# Patient Record
Sex: MALE | Race: WHITE | NOT HISPANIC OR LATINO | URBAN - METROPOLITAN AREA
[De-identification: names, ages, dates, MRNs, and addresses within clinical notes are randomized per-mention and may not be internally consistent; named-entity substitution may affect disease eponyms.]

---

## 2018-06-25 NOTE — LETTER
July 3, 2018     Den Ramirez  [address]   [address]  [place] [address]      Dear Mr. Ramirez:    Below are the results from your recent visit:    Resulted Orders   Comprehensive metabolic panel   Result Value Ref Range    Glucose, Serum 95 65 - 99 mg/dL    BUN 26 8 - 27 mg/dL    Creatinine, Serum 1.63 (H) 0.76 - 1.27 mg/dL    eGFR If NonAfricn Am 39 (L) >59 mL/min/1.73    eGFR If Africn Am 45 (L) >59 mL/min/1.73    BUN/Creatinine Ratio 16 10 - 24    Sodium, Serum 139 134 - 144 mmol/L    Potassium, Serum 4.3 3.5 - 5.2 mmol/L    Chloride, Serum 101 96 - 106 mmol/L    Carbon Dioxide, Total 21 20 - 29 mmol/L      Comment:                    **Please note reference interval change**    Calcium, Serum 8.9 8.6 - 10.2 mg/dL    Protein, Total, Serum 6.4 6.0 - 8.5 g/dL    Albumin, Serum 4.0 3.5 - 4.7 g/dL    Globulin, Total 2.4 1.5 - 4.5 g/dL    A/G Ratio 1.7 1.2 - 2.2    Bilirubin, Total 0.7 0.0 - 1.2 mg/dL    Alkaline Phosphatase, S 76 39 - 117 IU/L    AST (SGOT) 20 0 - 40 IU/L    ALT (SGPT) 11 0 - 44 IU/L    Narrative    Performed at:  [address]  [id number]  : Araceli B Reyes MD, Phone:  [phone]   Lipid panel   Result Value Ref Range    Cholesterol, Total 178 100 - 199 mg/dL    Triglycerides 83 0 - 149 mg/dL    HDL Cholesterol 72 >39 mg/dL    VLDL Cholesterol Jeremie 17 5 - 40 mg/dL    LDL Cholesterol Calc 89 0 - 99 mg/dL    Narrative    Performed at:  [address]  [id number]  : Araceli B Reyes MD, Phone:  [phone]   KARLA STRINGER CMP14 DEFAULT   Result Value Ref Range    Karla stringer Comment       Comment:      A hand-written panel/profile was received from your office. In  accordance with the [facility] Ambiguous Test Code Policy dated July 2003, we have completed your order by using the closest currently  or formerly recognized AMA panel.  We have assigned Comprehensive  Metabolic Panel (14), Test Code  #014731 to this request.  If this  is not the testing you wished to receive on this specimen, please  contact the [facility] Client Inquiry/Technical Services Department  to clarify the test order.  We appreciate your business.      Narrative    Performed at:  [address]  [id number]  : Araceli B Reyes MD, Phone:  [phone]   MAGDALENA STRINGER LP DEFAULT   Result Value Ref Range    Magdalena stringer Comment       Comment:      A hand-written panel/profile was received from your office. In  accordance with the [facility] Ambiguous Test Code Policy dated July 2003, we have completed your order by using the closest currently  or formerly recognized AMA panel.  We have assigned Lipid Panel,  Test Code #649984 to this request. If this is not the testing you  wished to receive on this specimen, please [facility]  Client Inquiry/Technical Services Department to clarify the test  order.  We appreciate your business.      Narrative    Performed at:  [address]  [id number]  : Araceli B Reyes MD, Phone:  [phone]     The test results show that they are stable. Please continue your current medication and plan.   If you have any questions or concerns, please do not hesitate to call.    Sincerely,          Vera Whitten, DO

## 2018-06-26 LAB
ALBUMIN SERPL-MCNC: 4 G/DL (ref 3.5–4.7)
ALBUMIN/GLOB SERPL: 1.7 {RATIO} (ref 1.2–2.2)
ALP SERPL-CCNC: 76 IU/L (ref 39–117)
ALT SERPL-CCNC: 11 IU/L (ref 0–44)
AST SERPL-CCNC: 20 IU/L (ref 0–40)
BILIRUB SERPL-MCNC: 0.7 MG/DL (ref 0–1.2)
BUN SERPL-MCNC: 26 MG/DL (ref 8–27)
BUN/CREAT SERPL: 16 (ref 10–24)
CALCIUM SERPL-MCNC: 8.9 MG/DL (ref 8.6–10.2)
CHLORIDE SERPL-SCNC: 101 MMOL/L (ref 96–106)
CHOLEST SERPL-MCNC: 178 MG/DL (ref 100–199)
CO2 SERPL-SCNC: 21 MMOL/L (ref 20–29)
CREAT SERPL-MCNC: 1.63 MG/DL (ref 0.76–1.27)
GFR SERPLBLD CREATININE-BSD FMLA CKD-EPI: 39 ML/MIN/1.73
GFR SERPLBLD CREATININE-BSD FMLA CKD-EPI: 45 ML/MIN/1.73
GLOBULIN SER CALC-MCNC: 2.4 G/DL (ref 1.5–4.5)
GLUCOSE SERPL-MCNC: 95 MG/DL (ref 65–99)
HDLC SERPL-MCNC: 72 MG/DL
LABCORP AMBIG ABBREV: NORMAL
LABCORP AMBIG ABBREV: NORMAL
LDLC SERPL CALC-MCNC: 89 MG/DL (ref 0–99)
POTASSIUM SERPL-SCNC: 4.3 MMOL/L (ref 3.5–5.2)
PROT SERPL-MCNC: 6.4 G/DL (ref 6–8.5)
SODIUM SERPL-SCNC: 139 MMOL/L (ref 134–144)
TRIGL SERPL-MCNC: 83 MG/DL (ref 0–149)
VLDLC SERPL CALC-MCNC: 17 MG/DL (ref 5–40)

## 2018-06-29 ENCOUNTER — OFFICE VISIT (OUTPATIENT)
Dept: FAMILY MEDICINE | Facility: CLINIC | Age: 83
End: 2018-06-29
Payer: COMMERCIAL

## 2018-06-29 VITALS
HEART RATE: 83 BPM | DIASTOLIC BLOOD PRESSURE: 90 MMHG | SYSTOLIC BLOOD PRESSURE: 132 MMHG | TEMPERATURE: 97.6 F | BODY MASS INDEX: 30.3 KG/M2 | OXYGEN SATURATION: 97 % | HEIGHT: 69 IN | WEIGHT: 204.6 LBS

## 2018-06-29 DIAGNOSIS — Z00.00 MEDICARE ANNUAL WELLNESS VISIT, SUBSEQUENT: Primary | ICD-10-CM

## 2018-06-29 PROBLEM — I10 HYPERTENSION: Status: ACTIVE | Noted: 2018-06-29

## 2018-06-29 PROBLEM — E78.5 DYSLIPIDEMIA: Status: ACTIVE | Noted: 2018-06-29

## 2018-06-29 PROCEDURE — G0439 PPPS, SUBSEQ VISIT: HCPCS | Performed by: FAMILY MEDICINE

## 2018-06-29 RX ORDER — MOMETASONE FUROATE MONOHYDRATE 50 UG/1
SPRAY, METERED NASAL
COMMUNITY
Start: 2017-12-19 | End: 2019-01-10 | Stop reason: SDUPTHER

## 2018-06-29 RX ORDER — HYDROCHLOROTHIAZIDE 25 MG/1
25 TABLET ORAL
Refills: 1 | COMMUNITY
Start: 2018-04-03 | End: 2018-09-27 | Stop reason: SDUPTHER

## 2018-06-29 RX ORDER — ASPIRIN 81 MG/1
81 TABLET ORAL
COMMUNITY
Start: 2015-12-08

## 2018-06-29 RX ORDER — CETIRIZINE HYDROCHLORIDE 10 MG/1
10 TABLET ORAL
COMMUNITY
Start: 2015-12-08

## 2018-06-29 NOTE — PROGRESS NOTES
"  Subjective     Den Ramirez is a 83 y.o. male who presents for a subsequent annual wellness visit.     - Sees Nephrology yearly for monitoring CKD  - Colonoscopy was years ago - states he is due in 3-4 years.  Had with Rowena  - Due for new Shingrix  - Needs to see Eye Doctor    Comprehensive Medical and Social History  Patient Active Problem List   Diagnosis   • Benign essential HTN   • Chronic kidney disease   • Dyslipidemia   • High cholesterol   • Hyperlipidemia LDL goal <100   • Hypertension   • Prediabetes     History reviewed. No pertinent past medical history.  History reviewed. No pertinent surgical history.  Allergies   Allergen Reactions   • No Known Allergies      Current Outpatient Prescriptions   Medication Sig Dispense Refill   • aspirin 81 mg enteric coated tablet 81 mg.     • cetirizine (ZyrTEC) 10 mg tablet 10 mg.     • hydrochlorothiazide (HYDRODIURIL) 25 mg tablet Take 25 mg by mouth once daily.  1   • mometasone (NASONEX) 50 mcg/actuation nasal spray spray 2 spray by intranasal route  every day in each nostril       No current facility-administered medications for this visit.      Social History     Social History   • Marital status: Single     Spouse name: N/A   • Number of children: N/A   • Years of education: N/A     Social History Main Topics   • Smoking status: Current Some Day Smoker   • Smokeless tobacco: Never Used   • Alcohol use Yes      Comment: ocassionally   • Drug use: No   • Sexual activity: Not Asked     Other Topics Concern   • None     Social History Narrative   • None       Objective   Vitals  Vitals:    06/29/18 1005   BP: 132/90   Pulse: 83   Temp: 36.4 °C (97.6 °F)   TempSrc: Oral   SpO2: 97%   Weight: 92.8 kg (204 lb 9.6 oz)   Height: 1.74 m (5' 8.5\")     Body mass index is 30.66 kg/m².    Advanced Care Plan                                        PHQ  Over the Past 2 Weeks, Have You Felt Down, Depressed or Hopeless?: no  Over the Past 2 Weeks, Have You Felt Little " Interest or Pleasure In Doing Things?: no                                          Mini Cog   4    Get Up and Go  Result: Pass            STEADI Falls Risk  One or more falls in the last year: No                                                       Falls screen completed: Yes     Hearing and Vision Screening  No exam data present      Assessment/Plan   Problem List Items Addressed This Visit     None      Visit Diagnoses     Medicare annual wellness visit, subsequent    -  Primary      1. Medicare annual wellness visit, subsequent  - Will obtain recent labs from [facility] and call with results  - If gas symptoms worsen, can see GI for repeat colonoscopy  - Get Shingrix at pharmacy  - Check BP at home periodically given mild elevation today  - See Eye Doctor for cataract evaluation  - Follow up in 6 months        See Patient Instructions (the written plan) which was given to the patient for PPPS and health risk factors with interventions.

## 2018-06-29 NOTE — PATIENT INSTRUCTIONS
- Keep an eye on your symptoms - gas increase can be related to increase in fiber in diet  - Look into new shingles vaccine - Shingrix - go to pharmacy to have this done  - Follow up in 6 months or sooner as needed - I'll call with lab results

## 2018-07-12 ENCOUNTER — TELEPHONE (OUTPATIENT)
Dept: FAMILY MEDICINE | Facility: CLINIC | Age: 83
End: 2018-07-12

## 2018-07-12 DIAGNOSIS — E78.5 HYPERLIPIDEMIA LDL GOAL <100: ICD-10-CM

## 2018-07-12 DIAGNOSIS — R73.03 PREDIABETES: Primary | ICD-10-CM

## 2018-07-12 DIAGNOSIS — N18.30 STAGE 3 CHRONIC KIDNEY DISEASE (CMS/HCC): ICD-10-CM

## 2018-07-12 DIAGNOSIS — Z12.5 PROSTATE CANCER SCREENING: ICD-10-CM

## 2018-07-12 NOTE — TELEPHONE ENCOUNTER
Pt needs refill of his pain cream and is looking for lab slip for follow up appt in jan. Please order for lab jimenez and mail to home when ready. Thanks

## 2018-08-02 ENCOUNTER — OFFICE VISIT (OUTPATIENT)
Dept: FAMILY MEDICINE | Facility: CLINIC | Age: 83
End: 2018-08-02
Payer: COMMERCIAL

## 2018-08-02 VITALS
HEIGHT: 69 IN | BODY MASS INDEX: 30.72 KG/M2 | WEIGHT: 207.4 LBS | HEART RATE: 95 BPM | SYSTOLIC BLOOD PRESSURE: 136 MMHG | DIASTOLIC BLOOD PRESSURE: 78 MMHG | OXYGEN SATURATION: 98 % | TEMPERATURE: 98.1 F | RESPIRATION RATE: 14 BRPM

## 2018-08-02 DIAGNOSIS — H25.9 AGE-RELATED CATARACT OF BOTH EYES, UNSPECIFIED AGE-RELATED CATARACT TYPE: Primary | ICD-10-CM

## 2018-08-02 DIAGNOSIS — E78.5 HYPERLIPIDEMIA LDL GOAL <100: ICD-10-CM

## 2018-08-02 DIAGNOSIS — Z12.5 PROSTATE CANCER SCREENING: ICD-10-CM

## 2018-08-02 DIAGNOSIS — N18.30 STAGE 3 CHRONIC KIDNEY DISEASE (CMS/HCC): ICD-10-CM

## 2018-08-02 DIAGNOSIS — R73.09 ELEVATED GLUCOSE: ICD-10-CM

## 2018-08-02 DIAGNOSIS — Z01.818 PREOP EXAMINATION: ICD-10-CM

## 2018-08-02 PROCEDURE — 99214 OFFICE O/P EST MOD 30 MIN: CPT | Performed by: FAMILY MEDICINE

## 2018-08-02 RX ORDER — MULTIVITAMIN WITH IRON
TABLET ORAL DAILY
COMMUNITY

## 2018-08-02 RX ORDER — GLUCOSAMINE/CHONDRO SU A 500-400 MG
1 TABLET ORAL DAILY
COMMUNITY
End: 2020-09-10 | Stop reason: ALTCHOICE

## 2018-08-02 NOTE — PROGRESS NOTES
Consult by Dr. Vizcaino  For surgery bilateral cataract   On 8/7 and 8/23    Den Ramirez is a 83 y.o. male     Past Medical History:   Diagnosis Date   • Arthritis     mild   • Spinal stenosis, lumbar 2017     Patient Active Problem List   Diagnosis   • Benign essential HTN   • Chronic kidney disease   • Dyslipidemia   • High cholesterol   • Hyperlipidemia LDL goal <100   • Hypertension   • Prediabetes     History reviewed. No pertinent surgical history.  Family History   Problem Relation Age of Onset   • Heart attack Father      Social History     Social History   • Marital status: Single     Spouse name: N/A   • Number of children: N/A   • Years of education: N/A     Social History Main Topics   • Smoking status: Current Some Day Smoker   • Smokeless tobacco: Never Used   • Alcohol use Yes      Comment: ocassionally   • Drug use: No   • Sexual activity: Not Asked     Other Topics Concern   • None     Social History Narrative   • None       Current Outpatient Prescriptions:   •  aspirin 81 mg enteric coated tablet, 81 mg., Disp: , Rfl:   •  cetirizine (ZyrTEC) 10 mg tablet, 10 mg., Disp: , Rfl:   •  glucosamine-chondroitin 500-400 mg tablet, Take 1 tablet by mouth daily., Disp: , Rfl:   •  hydrochlorothiazide (HYDRODIURIL) 25 mg tablet, Take 25 mg by mouth once daily., Disp: , Rfl: 1  •  mometasone (NASONEX) 50 mcg/actuation nasal spray, spray 2 spray by intranasal route  every day in each nostril, Disp: , Rfl:   •  multivitamin with iron tablet, Take by mouth daily., Disp: , Rfl:    Allergies   Allergen Reactions   • No Known Allergies        Characteristics of the pain are as follows:    Location: both eyes  Quality: no pain  Chronicity:  2 month(s)  Aggravating factors: reading and night driving  Alleviating factors: nothing  Associated symptoms: decrease vision    Takes nothing with no relief.      Review of Systems:  All other systems reviewed and negative except as noted in the HPI.    /78   Pulse 95    "Temp 36.7 °C (98.1 °F) (Oral)   Resp 14   Ht 1.74 m (5' 8.5\")   Wt 94.1 kg (207 lb 6.4 oz)   SpO2 98%   BMI 31.08 kg/m²    Gait:  Normal  Alert and well appearing   Awake and oriented with normal affect and appropriate behavior   HEENT: Ears: External ears normal. Canals clear. Tympanic membranes normal., Nose: normal, Throat: No oral lesions noted and Normocephalic and atraumatic, pupils equal, round, OP clear with moist mucous membranes  Neck:  supple, no thyroid enlargement, no LAD  Lungs: Normal breath sounds, lungs CTA with no RRW  Heart:Regular rate rhythm with no murmurs, gallops or rubs   Ext: No clubbing, cyanosis or edema   Neuro: Alert and oriented x 3, gait normal., reflexes normal and symmetric, strength and  sensation grossly normal     Patient stable for surgery.    Thank you,        Vera Whitten, DO     Greater than 50% of 25 minutes spent in discussion of problem, management, and/or coordination of care.     1. Age-related cataract of both eyes, unspecified age-related cataract type       2. Preop examination       3. Hyperlipidemia LDL goal <100     - Lipid panel    4. Stage 3 chronic kidney disease     - Comprehensive metabolic panel  - CBC and differential    5. Elevated glucose     - Hemoglobin A1c    6. Prostate cancer screening     - PSA   "

## 2018-08-02 NOTE — LETTER
Consult by Dr. Vizcaino  For surgery bilateral cataract   On 8/7 and 8/23    Den Ramirez is a 83 y.o. male     Past Medical History:   Diagnosis Date   • Arthritis     mild   • Spinal stenosis, lumbar 2017     Patient Active Problem List   Diagnosis   • Benign essential HTN   • Chronic kidney disease   • Dyslipidemia   • High cholesterol   • Hyperlipidemia LDL goal <100   • Hypertension   • Prediabetes     History reviewed. No pertinent surgical history.  Family History   Problem Relation Age of Onset   • Heart attack Father      Social History     Social History   • Marital status: Single     Spouse name: N/A   • Number of children: N/A   • Years of education: N/A     Social History Main Topics   • Smoking status: Current Some Day Smoker   • Smokeless tobacco: Never Used   • Alcohol use Yes      Comment: ocassionally   • Drug use: No   • Sexual activity: Not Asked     Other Topics Concern   • None     Social History Narrative   • None       Current Outpatient Prescriptions:   •  aspirin 81 mg enteric coated tablet, 81 mg., Disp: , Rfl:   •  cetirizine (ZyrTEC) 10 mg tablet, 10 mg., Disp: , Rfl:   •  glucosamine-chondroitin 500-400 mg tablet, Take 1 tablet by mouth daily., Disp: , Rfl:   •  hydrochlorothiazide (HYDRODIURIL) 25 mg tablet, Take 25 mg by mouth once daily., Disp: , Rfl: 1  •  mometasone (NASONEX) 50 mcg/actuation nasal spray, spray 2 spray by intranasal route  every day in each nostril, Disp: , Rfl:   •  multivitamin with iron tablet, Take by mouth daily., Disp: , Rfl:    Allergies   Allergen Reactions   • No Known Allergies        Characteristics of the pain are as follows:    Location: both eyes  Quality: no pain  Chronicity:  2 month(s)  Aggravating factors: reading and night driving  Alleviating factors: nothing  Associated symptoms: decrease vision    Takes nothing with no relief.      Review of Systems:  All other systems reviewed and negative except as noted in the HPI.    /78   Pulse 95    "Temp 36.7 °C (98.1 °F) (Oral)   Resp 14   Ht 1.74 m (5' 8.5\")   Wt 94.1 kg (207 lb 6.4 oz)   SpO2 98%   BMI 31.08 kg/m²    Gait:  Normal  Alert and well appearing   Awake and oriented with normal affect and appropriate behavior   HEENT: Ears: External ears normal. Canals clear. Tympanic membranes normal., Nose: normal, Throat: No oral lesions noted and Normocephalic and atraumatic, pupils equal, round, OP clear with moist mucous membranes  Neck:  supple, no thyroid enlargement, no LAD  Lungs: Normal breath sounds, lungs CTA with no RRW  Heart:Regular rate rhythm with no murmurs, gallops or rubs   Ext: No clubbing, cyanosis or edema   Neuro: Alert and oriented x 3, gait normal., reflexes normal and symmetric, strength and  sensation grossly normal     Patient stable for surgery.    Thank you,        Vera Whitten, DO   "

## 2018-09-27 RX ORDER — HYDROCHLOROTHIAZIDE 25 MG/1
TABLET ORAL
Qty: 90 TABLET | Refills: 1 | Status: SHIPPED | OUTPATIENT
Start: 2018-09-27 | End: 2019-01-10 | Stop reason: SDUPTHER

## 2019-01-02 LAB
BASOPHILS # BLD AUTO: 0 X10E3/UL (ref 0–0.2)
BASOPHILS NFR BLD AUTO: 0 %
EOSINOPHIL # BLD AUTO: 0.7 X10E3/UL (ref 0–0.4)
EOSINOPHIL NFR BLD AUTO: 10 %
ERYTHROCYTE [DISTWIDTH] IN BLOOD BY AUTOMATED COUNT: 13.3 % (ref 12.3–15.4)
HCT VFR BLD AUTO: 47 % (ref 37.5–51)
HGB BLD-MCNC: 15.5 G/DL (ref 13–17.7)
IMM GRANULOCYTES # BLD: 0 X10E3/UL (ref 0–0.1)
IMM GRANULOCYTES NFR BLD: 1 %
LYMPHOCYTES # BLD AUTO: 1.5 X10E3/UL (ref 0.7–3.1)
LYMPHOCYTES NFR BLD AUTO: 22 %
MCH RBC QN AUTO: 29 PG (ref 26.6–33)
MCHC RBC AUTO-ENTMCNC: 33 G/DL (ref 31.5–35.7)
MCV RBC AUTO: 88 FL (ref 79–97)
MONOCYTES # BLD AUTO: 0.7 X10E3/UL (ref 0.1–0.9)
MONOCYTES NFR BLD AUTO: 10 %
NEUTROPHILS # BLD AUTO: 3.9 X10E3/UL (ref 1.4–7)
NEUTROPHILS NFR BLD AUTO: 57 %
PLATELET # BLD AUTO: 231 X10E3/UL (ref 150–379)
RBC # BLD AUTO: 5.35 X10E6/UL (ref 4.14–5.8)
WBC # BLD AUTO: 6.8 X10E3/UL (ref 3.4–10.8)

## 2019-01-03 LAB
ALBUMIN SERPL-MCNC: 4.3 G/DL (ref 3.5–4.7)
ALBUMIN/GLOB SERPL: 2 {RATIO} (ref 1.2–2.2)
ALP SERPL-CCNC: 78 IU/L (ref 39–117)
ALT SERPL-CCNC: 12 IU/L (ref 0–44)
AST SERPL-CCNC: 22 IU/L (ref 0–40)
BILIRUB SERPL-MCNC: 0.5 MG/DL (ref 0–1.2)
BUN SERPL-MCNC: 38 MG/DL (ref 8–27)
BUN/CREAT SERPL: 20 (ref 10–24)
CALCIUM SERPL-MCNC: 9.1 MG/DL (ref 8.6–10.2)
CHLORIDE SERPL-SCNC: 103 MMOL/L (ref 96–106)
CHOLEST SERPL-MCNC: 188 MG/DL (ref 100–199)
CO2 SERPL-SCNC: 21 MMOL/L (ref 20–29)
CREAT SERPL-MCNC: 1.88 MG/DL (ref 0.76–1.27)
GLOBULIN SER CALC-MCNC: 2.1 G/DL (ref 1.5–4.5)
GLUCOSE SERPL-MCNC: 101 MG/DL (ref 65–99)
HBA1C MFR BLD: 5.5 % (ref 4.8–5.6)
HDLC SERPL-MCNC: 67 MG/DL
LAB CORP EGFR IF AFRICN AM: 37 ML/MIN/1.73
LAB CORP EGFR IF NONAFRICN AM: 32 ML/MIN/1.73
LDLC SERPL CALC-MCNC: 107 MG/DL (ref 0–99)
POTASSIUM SERPL-SCNC: 4.5 MMOL/L (ref 3.5–5.2)
PROT SERPL-MCNC: 6.4 G/DL (ref 6–8.5)
PSA SERPL-MCNC: 1.8 NG/ML (ref 0–4)
SODIUM SERPL-SCNC: 140 MMOL/L (ref 134–144)
TRIGL SERPL-MCNC: 70 MG/DL (ref 0–149)
VLDLC SERPL CALC-MCNC: 14 MG/DL (ref 5–40)

## 2019-01-10 ENCOUNTER — OFFICE VISIT (OUTPATIENT)
Dept: FAMILY MEDICINE | Facility: CLINIC | Age: 84
End: 2019-01-10
Payer: COMMERCIAL

## 2019-01-10 VITALS
TEMPERATURE: 98 F | DIASTOLIC BLOOD PRESSURE: 82 MMHG | RESPIRATION RATE: 16 BRPM | WEIGHT: 212 LBS | HEIGHT: 69 IN | HEART RATE: 84 BPM | SYSTOLIC BLOOD PRESSURE: 126 MMHG | BODY MASS INDEX: 31.4 KG/M2

## 2019-01-10 DIAGNOSIS — E66.09 CLASS 1 OBESITY DUE TO EXCESS CALORIES WITH SERIOUS COMORBIDITY AND BODY MASS INDEX (BMI) OF 31.0 TO 31.9 IN ADULT: ICD-10-CM

## 2019-01-10 DIAGNOSIS — N18.30 STAGE 3 CHRONIC KIDNEY DISEASE (CMS/HCC): ICD-10-CM

## 2019-01-10 DIAGNOSIS — R73.03 PREDIABETES: Primary | ICD-10-CM

## 2019-01-10 DIAGNOSIS — J30.89 ALLERGIC RHINITIS DUE TO OTHER ALLERGIC TRIGGER, UNSPECIFIED SEASONALITY: ICD-10-CM

## 2019-01-10 DIAGNOSIS — E78.5 HYPERLIPIDEMIA LDL GOAL <100: ICD-10-CM

## 2019-01-10 DIAGNOSIS — E66.811 CLASS 1 OBESITY DUE TO EXCESS CALORIES WITH SERIOUS COMORBIDITY AND BODY MASS INDEX (BMI) OF 31.0 TO 31.9 IN ADULT: ICD-10-CM

## 2019-01-10 DIAGNOSIS — I10 BENIGN ESSENTIAL HTN: ICD-10-CM

## 2019-01-10 PROCEDURE — 99214 OFFICE O/P EST MOD 30 MIN: CPT | Performed by: FAMILY MEDICINE

## 2019-01-10 RX ORDER — HYDROCHLOROTHIAZIDE 25 MG/1
25 TABLET ORAL
Qty: 90 TABLET | Refills: 1 | Status: SHIPPED | OUTPATIENT
Start: 2019-01-10 | End: 2019-09-17 | Stop reason: SDUPTHER

## 2019-01-10 RX ORDER — MOMETASONE FUROATE MONOHYDRATE 50 UG/1
2 SPRAY, METERED NASAL DAILY
Qty: 17 G | Refills: 5 | Status: SHIPPED | OUTPATIENT
Start: 2019-01-10 | End: 2019-09-17 | Stop reason: ALTCHOICE

## 2019-01-10 NOTE — PROGRESS NOTES
Past Medical History:   Diagnosis Date   • Arthritis     mild   • Spinal stenosis, lumbar 2017      Patient Active Problem List   Diagnosis   • Benign essential HTN   • Chronic kidney disease   • Dyslipidemia   • High cholesterol   • Hyperlipidemia LDL goal <100   • Hypertension   • Prediabetes      No past surgical history on file.   Family History   Problem Relation Age of Onset   • Heart attack Father       Social History     Social History   • Marital status: Single     Spouse name: N/A   • Number of children: N/A   • Years of education: N/A     Social History Main Topics   • Smoking status: Current Some Day Smoker   • Smokeless tobacco: Never Used   • Alcohol use Yes      Comment: ocassionally   • Drug use: No   • Sexual activity: Not Asked     Other Topics Concern   • None     Social History Narrative   • None      Current Outpatient Prescriptions on File Prior to Visit   Medication Sig Dispense Refill   • aspirin 81 mg enteric coated tablet 81 mg.     • cetirizine (ZyrTEC) 10 mg tablet 10 mg.     • glucosamine-chondroitin 500-400 mg tablet Take 1 tablet by mouth daily.     • hydrochlorothiazide (HYDRODIURIL) 25 mg tablet TAKE 1 TABLET BY MOUTH EVERY DAY 90 tablet 1   • mometasone (NASONEX) 50 mcg/actuation nasal spray spray 2 spray by intranasal route  every day in each nostril     • multivitamin with iron tablet Take by mouth daily.       No current facility-administered medications on file prior to visit.       Allergies   Allergen Reactions   • No Known Allergies         New concerns: doing well   Hypertension ROS: taking medications as instructed, no medication side effects noted, no chest pain on exertion, no dyspnea on exertion, no swelling of ankles, no orthostatic dizziness or lightheadedness, no orthopnea or paroxysmal nocturnal dyspnea.     Diet:  erratic  Weight: some holiday weight.  Exercise:  Getting more active and will swim  Review of Systems - All other systems reviewed and negative except as  "noted in the HPI.       /82   Pulse 84   Temp 36.7 °C (98 °F)   Resp 16   Ht 1.74 m (5' 8.5\")   Wt 96.2 kg (212 lb)   BMI 31.77 kg/m²    Gait:  Normal  Alert and well appearing   Awake and oriented with normal affect and appropriate behavior   HEENT: Normocephalic and atraumatic, pupils equal, round, OP clear with moist mucous membranes  Neck:  supple, no thyroid enlargement, no LAD  Lungs: Normal breath sounds, lungs CTA with no RRW  Heart:Regular rate rhythm with no murmurs, gallops or rubs   Ext: No clubbing, cyanosis or edema     labs reviewed, I note that glycosylated hemoglobin normal, lipids  LDL result does not yet meet goal, renal functions  mildly abnormal but acceptable, comprehensive metabolic panel  normal and except elevated glucose; normal CBC.     1. Prediabetes  Seemed resolved, recheck one year     2. Hyperlipidemia LDL goal <100  Almost controlled    3. Stage 3 chronic kidney disease (CMS/HCC)  Per nephro    4. Benign essential HTN  Continue current medications, recheck 6 months.  Treatment goals reviewed.  Discussed all medications with patient.  Patient verbalized understanding.   - hydrochlorothiazide (HYDRODIURIL) 25 mg tablet; Take 1 tablet (25 mg total) by mouth once daily.  Dispense: 90 tablet; Refill: 1    5. Class 1 obesity due to excess calories with serious comorbidity and body mass index (BMI) of 31.0 to 31.9 in adult  The patient is asked to make an attempt to improve diet and exercise patterns to aid in medical management of this problem.     6. Allergic rhinitis due to other allergic trigger, unspecified seasonality  Continue current medications, recheck 6 months.  Treatment goals reviewed.  Discussed all medications with patient.  Patient verbalized understanding.   - mometasone (NASONEX) 50 mcg/actuation nasal spray; Administer 2 sprays into each nostril daily.  Dispense: 17 g; Refill: 5    "

## 2019-08-22 LAB
ALBUMIN SERPL-MCNC: 4.2 G/DL (ref 3.5–4.7)
ALBUMIN/GLOB SERPL: 2 {RATIO} (ref 1.2–2.2)
ALP SERPL-CCNC: 72 IU/L (ref 39–117)
ALT SERPL-CCNC: 13 IU/L (ref 0–44)
AST SERPL-CCNC: 19 IU/L (ref 0–40)
BASOPHILS # BLD AUTO: 0 X10E3/UL (ref 0–0.2)
BASOPHILS NFR BLD AUTO: 1 %
BILIRUB SERPL-MCNC: 0.8 MG/DL (ref 0–1.2)
BUN SERPL-MCNC: 36 MG/DL (ref 8–27)
BUN/CREAT SERPL: 20 (ref 10–24)
CALCIUM SERPL-MCNC: 9.3 MG/DL (ref 8.6–10.2)
CHLORIDE SERPL-SCNC: 100 MMOL/L (ref 96–106)
CHOLEST SERPL-MCNC: 193 MG/DL (ref 100–199)
CO2 SERPL-SCNC: 23 MMOL/L (ref 20–29)
CREAT SERPL-MCNC: 1.79 MG/DL (ref 0.76–1.27)
EOSINOPHIL # BLD AUTO: 0.3 X10E3/UL (ref 0–0.4)
EOSINOPHIL NFR BLD AUTO: 4 %
ERYTHROCYTE [DISTWIDTH] IN BLOOD BY AUTOMATED COUNT: 13.4 % (ref 12.3–15.4)
GLOBULIN SER CALC-MCNC: 2.1 G/DL (ref 1.5–4.5)
GLUCOSE SERPL-MCNC: 94 MG/DL (ref 65–99)
HBA1C MFR BLD: 5.4 % (ref 4.8–5.6)
HCT VFR BLD AUTO: 47.6 % (ref 37.5–51)
HDLC SERPL-MCNC: 68 MG/DL
HGB BLD-MCNC: 15.7 G/DL (ref 13–17.7)
IMM GRANULOCYTES # BLD AUTO: 0 X10E3/UL (ref 0–0.1)
IMM GRANULOCYTES NFR BLD AUTO: 1 %
LAB CORP EGFR IF AFRICN AM: 40 ML/MIN/1.73
LAB CORP EGFR IF NONAFRICN AM: 34 ML/MIN/1.73
LDLC SERPL CALC-MCNC: 110 MG/DL (ref 0–99)
LYMPHOCYTES # BLD AUTO: 1.2 X10E3/UL (ref 0.7–3.1)
LYMPHOCYTES NFR BLD AUTO: 18 %
MCH RBC QN AUTO: 29.5 PG (ref 26.6–33)
MCHC RBC AUTO-ENTMCNC: 33 G/DL (ref 31.5–35.7)
MCV RBC AUTO: 90 FL (ref 79–97)
MONOCYTES # BLD AUTO: 0.5 X10E3/UL (ref 0.1–0.9)
MONOCYTES NFR BLD AUTO: 8 %
NEUTROPHILS # BLD AUTO: 4.5 X10E3/UL (ref 1.4–7)
NEUTROPHILS NFR BLD AUTO: 68 %
PLATELET # BLD AUTO: 227 X10E3/UL (ref 150–450)
POTASSIUM SERPL-SCNC: 4.4 MMOL/L (ref 3.5–5.2)
PROT SERPL-MCNC: 6.3 G/DL (ref 6–8.5)
PSA SERPL-MCNC: 1.9 NG/ML (ref 0–4)
RBC # BLD AUTO: 5.32 X10E6/UL (ref 4.14–5.8)
SODIUM SERPL-SCNC: 140 MMOL/L (ref 134–144)
TRIGL SERPL-MCNC: 77 MG/DL (ref 0–149)
VLDLC SERPL CALC-MCNC: 15 MG/DL (ref 5–40)
WBC # BLD AUTO: 6.6 X10E3/UL (ref 3.4–10.8)

## 2019-09-17 ENCOUNTER — OFFICE VISIT (OUTPATIENT)
Dept: FAMILY MEDICINE | Facility: CLINIC | Age: 84
End: 2019-09-17
Payer: COMMERCIAL

## 2019-09-17 VITALS
WEIGHT: 215 LBS | SYSTOLIC BLOOD PRESSURE: 126 MMHG | HEART RATE: 84 BPM | TEMPERATURE: 98.1 F | BODY MASS INDEX: 31.84 KG/M2 | HEIGHT: 69 IN | RESPIRATION RATE: 16 BRPM | DIASTOLIC BLOOD PRESSURE: 84 MMHG

## 2019-09-17 DIAGNOSIS — N18.30 CKD (CHRONIC KIDNEY DISEASE), STAGE III (CMS/HCC): Primary | ICD-10-CM

## 2019-09-17 DIAGNOSIS — E78.5 HYPERLIPIDEMIA LDL GOAL <100: ICD-10-CM

## 2019-09-17 DIAGNOSIS — I10 BENIGN ESSENTIAL HTN: ICD-10-CM

## 2019-09-17 DIAGNOSIS — G89.29 CHRONIC LEFT SHOULDER PAIN: ICD-10-CM

## 2019-09-17 DIAGNOSIS — M25.512 CHRONIC LEFT SHOULDER PAIN: ICD-10-CM

## 2019-09-17 DIAGNOSIS — N25.81 HYPERPARATHYROIDISM, SECONDARY RENAL (CMS/HCC): ICD-10-CM

## 2019-09-17 DIAGNOSIS — R73.03 PREDIABETES: ICD-10-CM

## 2019-09-17 PROCEDURE — 90471 IMMUNIZATION ADMIN: CPT | Performed by: FAMILY MEDICINE

## 2019-09-17 PROCEDURE — 99214 OFFICE O/P EST MOD 30 MIN: CPT | Mod: 25 | Performed by: FAMILY MEDICINE

## 2019-09-17 PROCEDURE — 90653 IIV ADJUVANT VACCINE IM: CPT | Performed by: FAMILY MEDICINE

## 2019-09-17 RX ORDER — HYDROCHLOROTHIAZIDE 25 MG/1
25 TABLET ORAL
Qty: 90 TABLET | Refills: 1 | Status: SHIPPED | OUTPATIENT
Start: 2019-09-17 | End: 2020-04-22 | Stop reason: SDUPTHER

## 2019-09-17 NOTE — PATIENT INSTRUCTIONS
[facility]  [facility] is an ideal partner to get you out of pain then back to optimal health and performance. We offer a unique combination of excellent customer service, highly trained therapists, innovative, evidence-based practice and comfortable, well-equipped and convenient facilities.     Anyone who has an injury, pain or movement impairment can attend physical therapy. The following conditions are frequently seen in our [place] clinics:  Low back pain and neck pain including herniated discs and spinal stenosis  Rotator cuff injuries and other shoulder problems   Concussions   Vestibular and balance problems   Knee pain   Arthritis   Plantar fasciitis   Post-operative rehabilitation from orthopedic surgical procedures   Muscle strains   Joint sprains/pain/swelling, including knee and ankle sprains   Chronic pain   Tendinosis and tendinitis including Tennis/Golfer’s elbow   Carpal Tunnel Syndrome   Hand injuries and surgeries    Running and cross-fit injuries   Sports injuries    · [place] [address], [address]  [place] [address]   Phone: [phone]  Fax: [phone]  · [facility]  [address] [place] [address]   Phone: [phone]   Fax: [phone]  · [place] [address] [place] [address]   Phone: [phone]   Fax: [phone]  · [address] [address]  Phone: [phone]   Fax: [phone]  Monday thru Thursday: 8:00 am - 8:00 pm   Friday: 8:00 am - 5:00 pm   Saturday: 8:00 am - 12:00 pm     [facility]  [place]  ph: [phone]  fx: [phone]    [address] [place] [address]  [facility]  Our Physical Therapy Clinic [place] Specialties Include:  Physical Therapy   Sports Medicine [place]   Back and Neck Pain   Shoulder Pain   Kinesiotaping   Concussion Rehabilitation   And More      NO REFERRAL IS  NEEDED TO SEE OUR  PHYSICAL THERAPISTS:  If you are in pain, call [facility] at [phone] to schedule an appointment, or FREE Phone Consultation.  [facility] is happy to offer FREE phone consultations with a Physical Therapist to see if our services are right for you and answer any questions that you might have. Please call [phone] to schedule this today.  * Not applicable to patients in federal or state funded programs, such as Medicare and Medicaid.  * Screenings are consultations and do not involve a physical therapy evaluation or treatment.

## 2019-09-17 NOTE — PROGRESS NOTES
"Prediabetes Check   Den Ramirez is a 85 y.o. male   /84   Pulse 84   Temp 36.7 °C (98.1 °F)   Resp 16   Ht 1.74 m (5' 8.5\")   Wt 97.5 kg (215 lb)   BMI 32.22 kg/m²      Current Outpatient Prescriptions:   •  aspirin 81 mg enteric coated tablet, 81 mg., Disp: , Rfl:   •  cetirizine (ZyrTEC) 10 mg tablet, 10 mg., Disp: , Rfl:   •  glucosamine-chondroitin 500-400 mg tablet, Take 1 tablet by mouth daily., Disp: , Rfl:   •  hydrochlorothiazide (HYDRODIURIL) 25 mg tablet, Take 1 tablet (25 mg total) by mouth once daily., Disp: 90 tablet, Rfl: 1  •  mometasone (NASONEX) 50 mcg/actuation nasal spray, Administer 2 sprays into each nostril daily., Disp: 17 g, Rfl: 5  •  multivitamin with iron tablet, Take by mouth daily., Disp: , Rfl:    Social History     Social History   • Marital status: Single     Spouse name: N/A   • Number of children: N/A   • Years of education: N/A     Social History Main Topics   • Smoking status: Current Some Day Smoker   • Smokeless tobacco: Never Used   • Alcohol use Yes      Comment: ocassionally   • Drug use: No   • Sexual activity: Not Asked     Other Topics Concern   • None     Social History Narrative   • None      Family History   Problem Relation Age of Onset   • Heart attack Biological Father       Past Medical History:   Diagnosis Date   • Arthritis     mild   • Spinal stenosis, lumbar 2017      Patient Active Problem List   Diagnosis   • Benign essential HTN   • Chronic kidney disease   • Dyslipidemia   • High cholesterol   • Hyperlipidemia LDL goal <100   • Hypertension   • Prediabetes      Glu: @LABVALUE(gluc)@   Last HgA1C:   Lab Results   Component Value Date    HGBA1C 5.4 08/21/2019     Last Lipid Panel:   Lab Results   Component Value Date    CHOL 193 08/21/2019    CHOL 188 01/02/2019    CHOL 178 06/25/2018     Lab Results   Component Value Date    HDL 68 08/21/2019    HDL 67 01/02/2019    HDL 72 06/25/2018     Lab Results   Component Value Date    LDLCALC 110 (H) " "08/21/2019    LDLCALC 107 (H) 01/02/2019    LDLCALC 89 06/25/2018     Lab Results   Component Value Date    TRIG 77 08/21/2019    TRIG 70 01/02/2019    TRIG 83 06/25/2018     No results found for: CHOLHDL      New concerns: left back of shoulder pain x 9 months, no trauma; was doing free weights ; takes tylenol with some relief  Cyst in left pinkie  Hypertension ROS: taking medications as instructed, no medication side effects noted, no chest pain on exertion, no dyspnea on exertion, no swelling of ankles.     Weight: The patient reports no significant weight change.  Diet: trying  Exercise: no times/week  Type: tired   ROS:  no polyuria or polydipsia, no chest pain, dyspnea or TIA's, no numbness, tingling or pain in extremities   All other systems reviewed and negative except as noted in the HPI.     /84   Pulse 84   Temp 36.7 °C (98.1 °F)   Resp 16   Ht 1.74 m (5' 8.5\")   Wt 97.5 kg (215 lb)   BMI 32.22 kg/m²    Gait:  Normal  Alert and well appearing   Awake and oriented with normal affect and appropriate behavior   HEENT: Normocephalic and atraumatic, pupils equal, round, OP clear with moist mucous membranes  Neck:  supple, no thyroid enlargement, no LAD  Lungs: Normal breath sounds, lungs CTA with no RRW  Heart:Regular rate rhythm with no murmurs, gallops or rubs   Ext: No clubbing, cyanosis or edema   Neers: Negative  Wolfe: Negative  Silverdale's: Negative  Lift Off: Negative  Cross Arm: Negative  Deformity :Negative   Muscle strength:  muscle strength 5/5 on all major muscle groups  Tight left trapezius  Negative spurling  No sinus process tenderness    labs reviewed, I note that glycosylated hemoglobin normal, lipids  LDL result does not yet meet goal, renal functions  stable and abnormal CKD 3, comprehensive metabolic panel  otherwise normal.  CBC normal    1. CKD (chronic kidney disease), stage III (CMS/HCC) ([facility])  bp good today; will get bp cuff    2. Hyperparathyroidism, secondary renal " (CMS/[facility]) (HCC)  Followed by nephro    3. Hyperlipidemia LDL goal <100     - Lipid panel; Future  - Lipid panel    4. Benign essential HTN  Continue current medications, recheck 6 months.  Treatment goals reviewed.  Discussed all medications with patient.  Patient verbalized understanding.   - hydrochlorothiazide (HYDRODIURIL) 25 mg tablet; Take 1 tablet (25 mg total) by mouth once daily.  Dispense: 90 tablet; Refill: 1    5. Chronic left shoulder pain   no NSAIDs  - Ambulatory referral to Physical Therapy; Future    6. Prediabetes  If normal next draw will resolve  - Comprehensive metabolic panel; Future  - Hemoglobin A1c; Future  - Comprehensive metabolic panel  - Hemoglobin A1c

## 2019-11-15 LAB
ALBUMIN SERPL-MCNC: 4.2 G/DL (ref 3.5–4.7)
ALBUMIN/GLOB SERPL: 1.9 {RATIO} (ref 1.2–2.2)
ALP SERPL-CCNC: 71 IU/L (ref 39–117)
ALT SERPL-CCNC: 12 IU/L (ref 0–44)
AST SERPL-CCNC: 17 IU/L (ref 0–40)
BILIRUB SERPL-MCNC: 0.5 MG/DL (ref 0–1.2)
BUN SERPL-MCNC: 38 MG/DL (ref 8–27)
BUN/CREAT SERPL: 19 (ref 10–24)
CALCIUM SERPL-MCNC: 9.3 MG/DL (ref 8.6–10.2)
CHLORIDE SERPL-SCNC: 101 MMOL/L (ref 96–106)
CHOLEST SERPL-MCNC: 189 MG/DL (ref 100–199)
CO2 SERPL-SCNC: 26 MMOL/L (ref 20–29)
CREAT SERPL-MCNC: 1.98 MG/DL (ref 0.76–1.27)
GLOBULIN SER CALC-MCNC: 2.2 G/DL (ref 1.5–4.5)
GLUCOSE SERPL-MCNC: 81 MG/DL (ref 65–99)
HDLC SERPL-MCNC: 76 MG/DL
LAB CORP EGFR IF AFRICN AM: 35 ML/MIN/1.73
LAB CORP EGFR IF NONAFRICN AM: 30 ML/MIN/1.73
LDLC SERPL CALC-MCNC: 96 MG/DL (ref 0–99)
POTASSIUM SERPL-SCNC: 4.2 MMOL/L (ref 3.5–5.2)
PROT SERPL-MCNC: 6.4 G/DL (ref 6–8.5)
SODIUM SERPL-SCNC: 138 MMOL/L (ref 134–144)
TRIGL SERPL-MCNC: 85 MG/DL (ref 0–149)
VLDLC SERPL CALC-MCNC: 17 MG/DL (ref 5–40)

## 2019-11-16 LAB — HBA1C MFR BLD: 5.5 % (ref 4.8–5.6)

## 2020-02-19 LAB
ALBUMIN SERPL-MCNC: 3.9 G/DL (ref 3.6–4.6)
ALBUMIN/GLOB SERPL: 1.8 {RATIO} (ref 1.2–2.2)
ALP SERPL-CCNC: 73 IU/L (ref 39–117)
ALT SERPL-CCNC: 12 IU/L (ref 0–44)
AST SERPL-CCNC: 19 IU/L (ref 0–40)
BILIRUB SERPL-MCNC: 0.5 MG/DL (ref 0–1.2)
BUN SERPL-MCNC: 31 MG/DL (ref 8–27)
BUN/CREAT SERPL: 17 (ref 10–24)
CALCIUM SERPL-MCNC: 9 MG/DL (ref 8.6–10.2)
CHLORIDE SERPL-SCNC: 103 MMOL/L (ref 96–106)
CHOLEST SERPL-MCNC: 187 MG/DL (ref 100–199)
CO2 SERPL-SCNC: 24 MMOL/L (ref 20–29)
CREAT SERPL-MCNC: 1.85 MG/DL (ref 0.76–1.27)
GLOBULIN SER CALC-MCNC: 2.2 G/DL (ref 1.5–4.5)
GLUCOSE SERPL-MCNC: 101 MG/DL (ref 65–99)
HBA1C MFR BLD: 5.3 % (ref 4.8–5.6)
HDLC SERPL-MCNC: 74 MG/DL
LAB CORP EGFR IF AFRICN AM: 38 ML/MIN/1.73
LAB CORP EGFR IF NONAFRICN AM: 33 ML/MIN/1.73
LDLC SERPL CALC-MCNC: 102 MG/DL (ref 0–99)
POTASSIUM SERPL-SCNC: 4.4 MMOL/L (ref 3.5–5.2)
PROT SERPL-MCNC: 6.1 G/DL (ref 6–8.5)
SODIUM SERPL-SCNC: 141 MMOL/L (ref 134–144)
TRIGL SERPL-MCNC: 57 MG/DL (ref 0–149)
VLDLC SERPL CALC-MCNC: 11 MG/DL (ref 5–40)

## 2020-03-19 ENCOUNTER — TELEMEDICINE (OUTPATIENT)
Dept: FAMILY MEDICINE | Facility: CLINIC | Age: 85
End: 2020-03-19
Payer: COMMERCIAL

## 2020-03-19 DIAGNOSIS — E78.5 HYPERLIPIDEMIA LDL GOAL <100: ICD-10-CM

## 2020-03-19 DIAGNOSIS — I10 BENIGN ESSENTIAL HTN: Primary | ICD-10-CM

## 2020-03-19 DIAGNOSIS — R73.02 GLUCOSE INTOLERANCE (IMPAIRED GLUCOSE TOLERANCE): ICD-10-CM

## 2020-03-19 DIAGNOSIS — N18.30 CKD (CHRONIC KIDNEY DISEASE), STAGE III (CMS/HCC): ICD-10-CM

## 2020-03-19 PROCEDURE — 99213 OFFICE O/P EST LOW 20 MIN: CPT | Mod: 95 | Performed by: FAMILY MEDICINE

## 2020-03-19 RX ORDER — AMLODIPINE BESYLATE 2.5 MG/1
2.5 TABLET ORAL DAILY
COMMUNITY
End: 2021-03-11 | Stop reason: SDUPTHER

## 2020-03-19 NOTE — PROGRESS NOTES
Request for Consent:  Started 1:59 ended 2:13  You and I are about to have a telemedicine check-in or visit. This is allowed because you are already my patient, and you have requested it.  This telemedicine visit will be billed to your health insurance or you, if you are self-insured.  You understand you will be responsible for any copayments or coinsurances that apply to your telemedicine visit.  Before starting our telemedicine visit, I am required to get your consent for this virtual check-in or visit by telemedicine. Do you consent?      Patient Response to Request for Consent: Yes      The following have been reviewed and updated as appropriate in this visit:       Med check:  Home bp 129/77  [facility] - put on amlodipine 2.5  Visit Documentation:  New concerns: none, new med from [facility]   Med ROS: taking medications as instructed, no medication side effects noted, no chest pain on exertion, no dyspnea on exertion, no swelling of ankles.     Diet:  okay  Weight: The patient reports no significant weight change.  Exercise:  Trying to go for walks  Review of Systems - All other systems reviewed and negative except as noted in the HPI.     Labs reviewed: see below  Isolation precautions:  Had to go into work last week, but has been home since.  , glucose 101 but Hba1c 5.3    Feels well, no complaints.     Diagnosis Plan   1. Benign essential HTN     2. CKD (chronic kidney disease), stage III (CMS/HCC)     3. Hyperlipidemia LDL goal <100     4. Glucose intolerance (impaired glucose tolerance)       Discussed okay to get blood work for me once a week b/c Hba1c and LDL are okay  Continue current medications, recheck 6 months.  Treatment goals reviewed.  Discussed all medications with patient.  Patient verbalized understanding.         Time Spent in Medical Discussion During This Encounter:  14 minutes

## 2020-04-22 DIAGNOSIS — I10 BENIGN ESSENTIAL HTN: ICD-10-CM

## 2020-04-22 RX ORDER — HYDROCHLOROTHIAZIDE 25 MG/1
25 TABLET ORAL
Qty: 90 TABLET | Refills: 1 | Status: SHIPPED | OUTPATIENT
Start: 2020-04-22 | End: 2020-09-10 | Stop reason: SDUPTHER

## 2020-04-22 NOTE — TELEPHONE ENCOUNTER
Medicine Refill Request    Last Office Visit: 9/17/2019  Next Office Visit: 5/22/2020        Current Outpatient Medications:   •  amLODIPine (NORVASC) 2.5 mg tablet, Take 2.5 mg by mouth daily., Disp: , Rfl:   •  aspirin 81 mg enteric coated tablet, 81 mg., Disp: , Rfl:   •  cetirizine (ZyrTEC) 10 mg tablet, 10 mg., Disp: , Rfl:   •  glucosamine-chondroitin 500-400 mg tablet, Take 1 tablet by mouth daily., Disp: , Rfl:   •  hydrochlorothiazide (HYDRODIURIL) 25 mg tablet, Take 1 tablet (25 mg total) by mouth once daily., Disp: 90 tablet, Rfl: 1  •  multivitamin with iron tablet, Take by mouth daily., Disp: , Rfl:       BP Readings from Last 3 Encounters:   09/17/19 126/84   01/10/19 126/82   08/02/18 136/78       Recent Lab results:  Lab Results   Component Value Date    CHOL 187 02/18/2020   ,   Lab Results   Component Value Date    HDL 74 02/18/2020   ,   Lab Results   Component Value Date    LDLCALC 102 (H) 02/18/2020   ,   Lab Results   Component Value Date    TRIG 57 02/18/2020        Lab Results   Component Value Date    GLUCOSE 101 (H) 02/18/2020   ,   Lab Results   Component Value Date    HGBA1C 5.3 02/18/2020         Lab Results   Component Value Date    CREATININE 1.85 (H) 02/18/2020       No results found for: TSH

## 2020-08-03 ENCOUNTER — TELEPHONE (OUTPATIENT)
Dept: FAMILY MEDICINE | Facility: CLINIC | Age: 85
End: 2020-08-03

## 2020-08-03 DIAGNOSIS — E78.5 HYPERLIPIDEMIA LDL GOAL <100: ICD-10-CM

## 2020-08-03 DIAGNOSIS — R73.02 GLUCOSE INTOLERANCE (IMPAIRED GLUCOSE TOLERANCE): Primary | ICD-10-CM

## 2020-08-03 NOTE — TELEPHONE ENCOUNTER
Pt needs fasting bloodwork sent to [facility] for annual bloodwork. Please mail to script to home ready. Thanks

## 2020-09-01 LAB
ALBUMIN SERPL-MCNC: 4.2 G/DL (ref 3.6–4.6)
ALBUMIN/GLOB SERPL: 2.2 {RATIO} (ref 1.2–2.2)
ALP SERPL-CCNC: 81 IU/L (ref 39–117)
ALT SERPL-CCNC: 11 IU/L (ref 0–44)
AST SERPL-CCNC: 20 IU/L (ref 0–40)
BILIRUB SERPL-MCNC: 0.5 MG/DL (ref 0–1.2)
BUN SERPL-MCNC: 36 MG/DL (ref 8–27)
BUN/CREAT SERPL: 18 (ref 10–24)
CALCIUM SERPL-MCNC: 8.9 MG/DL (ref 8.6–10.2)
CHLORIDE SERPL-SCNC: 105 MMOL/L (ref 96–106)
CO2 SERPL-SCNC: 21 MMOL/L (ref 20–29)
CREAT SERPL-MCNC: 1.99 MG/DL (ref 0.76–1.27)
GLOBULIN SER CALC-MCNC: 1.9 G/DL (ref 1.5–4.5)
GLUCOSE SERPL-MCNC: 99 MG/DL (ref 65–99)
LAB CORP EGFR IF AFRICN AM: 35 ML/MIN/1.73
LAB CORP EGFR IF NONAFRICN AM: 30 ML/MIN/1.73
POTASSIUM SERPL-SCNC: 4.3 MMOL/L (ref 3.5–5.2)
PROT SERPL-MCNC: 6.1 G/DL (ref 6–8.5)
SODIUM SERPL-SCNC: 139 MMOL/L (ref 134–144)

## 2020-09-02 LAB
CHOLEST SERPL-MCNC: 175 MG/DL (ref 100–199)
HBA1C MFR BLD: 5.5 % (ref 4.8–5.6)
HDLC SERPL-MCNC: 69 MG/DL
LAB CORP COMMENT:: NORMAL
LDLC SERPL CALC-MCNC: 93 MG/DL (ref 0–99)
TRIGL SERPL-MCNC: 70 MG/DL (ref 0–149)
VLDLC SERPL CALC-MCNC: 13 MG/DL (ref 5–40)

## 2020-09-10 ENCOUNTER — OFFICE VISIT (OUTPATIENT)
Dept: FAMILY MEDICINE | Facility: CLINIC | Age: 85
End: 2020-09-10
Payer: COMMERCIAL

## 2020-09-10 VITALS
DIASTOLIC BLOOD PRESSURE: 68 MMHG | BODY MASS INDEX: 31.78 KG/M2 | WEIGHT: 214.6 LBS | TEMPERATURE: 98 F | OXYGEN SATURATION: 96 % | HEIGHT: 69 IN | RESPIRATION RATE: 12 BRPM | HEART RATE: 97 BPM | SYSTOLIC BLOOD PRESSURE: 128 MMHG

## 2020-09-10 DIAGNOSIS — R53.83 OTHER FATIGUE: ICD-10-CM

## 2020-09-10 DIAGNOSIS — I10 BENIGN ESSENTIAL HTN: Primary | ICD-10-CM

## 2020-09-10 DIAGNOSIS — E78.5 HYPERLIPIDEMIA LDL GOAL <100: ICD-10-CM

## 2020-09-10 DIAGNOSIS — N25.81 HYPERPARATHYROIDISM, SECONDARY RENAL (CMS/HCC): ICD-10-CM

## 2020-09-10 DIAGNOSIS — N18.30 CKD (CHRONIC KIDNEY DISEASE), STAGE III (CMS/HCC): ICD-10-CM

## 2020-09-10 PROCEDURE — 99214 OFFICE O/P EST MOD 30 MIN: CPT | Mod: 25 | Performed by: FAMILY MEDICINE

## 2020-09-10 PROCEDURE — 90653 IIV ADJUVANT VACCINE IM: CPT | Performed by: FAMILY MEDICINE

## 2020-09-10 PROCEDURE — 90471 IMMUNIZATION ADMIN: CPT | Performed by: FAMILY MEDICINE

## 2020-09-10 RX ORDER — HYDROCHLOROTHIAZIDE 25 MG/1
25 TABLET ORAL
Qty: 90 TABLET | Refills: 1 | Status: SHIPPED | OUTPATIENT
Start: 2020-09-10

## 2020-09-10 NOTE — LETTER
October 16, 2020     Den Ramirez  [address] [facility] [address] [place] [id number]      Dear Mr. Ramirez:    Below are the results from your recent visit:    Resulted Orders   CBC and differential   Result Value Ref Range    WBC 7.3 3.4 - 10.8 x10E3/uL    RBC 4.99 4.14 - 5.80 x10E6/uL    Hemoglobin 15.0 13.0 - 17.7 g/dL    Hematocrit 44.7 37.5 - 51.0 %    MCV 90 79 - 97 fL    MCH 30.1 26.6 - 33.0 pg    MCHC 33.6 31.5 - 35.7 g/dL    RDW 12.5 11.6 - 15.4 %    Platelets 255 150 - 450 x10E3/uL    Neutrophils 63 Not Estab. %    Lymphs 21 Not Estab. %    Monocytes 8 Not Estab. %    Eos 6 Not Estab. %    Basos 1 Not Estab. %    Neutrophils (Absolute) 4.7 1.4 - 7.0 x10E3/uL    Lymphs (Absolute) 1.5 0.7 - 3.1 x10E3/uL    Monocytes(Absolute) 0.6 0.1 - 0.9 x10E3/uL    Eos (Absolute) 0.5 (H) 0.0 - 0.4 x10E3/uL    Baso (Absolute) 0.1 0.0 - 0.2 x10E3/uL    Immature Granulocytes 1 Not Estab. %    Immature Grans (Abs) 0.1 0.0 - 0.1 x10E3/uL    Narrative    Performed at:  [address] [place] [address]  [id number]  : Araceli B Reyes MD, Phone:  [phone]   TSH w reflex FT4   Result Value Ref Range    TSH 3.240 0.450 - 4.500 uIU/mL    T4,Free(Direct) 1.24 0.82 - 1.77 ng/dL    Narrative    Performed at:  [facility]  [id number]  : Araceli B Reyes MD, Phone:  [phone]   Lyme Disease Antibodies (IgG, IgM),   Result Value Ref Range    IgG P93 Ab. Absent     IgG P66 Ab. Absent     IgG P58 Ab. Present (A)     IgG P45 Ab. Absent     IgG P41 Ab. Absent     IgG P39 Ab. Present (A)     IgG P30 Ab. Absent     IgG P28 Ab. Absent     IgG P23 Ab. Present (A)     IgG P18 Ab. Present (A)     Lyme IgG WB Interp. Negative       Comment:                           Positive: 5 of the following                                 Borrelia-specific bands:                                 18,23,28,30,39,41,45,58,                                 66, and 93.                        Negative: No bands or banding                                 patterns which do not                                 meet positive criteria.      IgM P41 Ab. Absent     IgM P39 Ab. Absent     IgM P23 Ab. Present (A)     Lyme IgM WB Interp. Negative       Comment:      Note: An equivocal or positive EIA result followed by a negative  Line Blot result is considered NEGATIVE. An equivocal or positive  EIA result followed by a positive Line Blot is considered POSITIVE  by the CDC.  Positive: 2 of the following bands: 23,39 or 41  Negative: No bands or banding patterns which do not meet positive  criteria.  Criteria for positivity are those recommended by CDC/ASTPHLD.  p23=Osp C, o24=frksrpuop  Note:  Sera from individuals with the following may cross react in the  Lyme Line Blot assays: other spirochetal diseases (periodontal  disease, leptospirosis, relapsing fever, yaws, and pinta);  connective autoimmune (Rheumatoid Arthritis and Systemic Lupus  Erythematosus and also individuals with Antinuclear Antibody);  other infections ([place] Spotted Fever; Bunny-Barr Virus,  and Cytomegalovirus).      Narrative    Performed at:  [address] [place] [address]  [id number]  : Araceli B Reyes MD, Phone:  [phone]     The test results show labs are okay and no obvious sign for fatigue.      If you have any questions or concerns, please do not hesitate to call.         Sincerely,        Vera Whitten, DO

## 2020-09-10 NOTE — PROGRESS NOTES
"Prediabetes Check   Den Ramirez is a 86 y.o. male   Visit Vitals  /68 (BP Location: Right upper arm, Patient Position: Sitting)   Pulse 97   Temp 36.7 °C (98 °F) (Temporal)   Resp 12   Ht 1.74 m (5' 8.5\")   Wt 97.3 kg (214 lb 9.6 oz)   SpO2 96%   BMI 32.16 kg/m²        Current Outpatient Medications:   •  amLODIPine (NORVASC) 2.5 mg tablet, Take 2.5 mg by mouth daily., Disp: , Rfl:   •  aspirin 81 mg enteric coated tablet, 81 mg., Disp: , Rfl:   •  cetirizine (ZyrTEC) 10 mg tablet, 10 mg., Disp: , Rfl:   •  hydrochlorothiazide (HYDRODIURIL) 25 mg tablet, Take 1 tablet (25 mg total) by mouth once daily., Disp: 90 tablet, Rfl: 1  •  multivitamin with iron tablet, Take by mouth daily., Disp: , Rfl:   •  glucosamine-chondroitin 500-400 mg tablet, Take 1 tablet by mouth daily., Disp: , Rfl:    Social History     Socioeconomic History   • Marital status: Single     Spouse name: None   • Number of children: None   • Years of education: None   • Highest education level: None   Occupational History   • None   Social Needs   • Financial resource strain: None   • Food insecurity:     Worry: None     Inability: None   • Transportation needs:     Medical: None     Non-medical: None   Tobacco Use   • Smoking status: Former Smoker   • Smokeless tobacco: Never Used   • Tobacco comment: no longer cigars   Substance and Sexual Activity   • Alcohol use: Yes     Alcohol/week: 7.0 standard drinks     Types: 7 Shots of liquor per week     Comment: ocassionally   • Drug use: No   • Sexual activity: None   Lifestyle   • Physical activity:     Days per week: None     Minutes per session: None   • Stress: None   Relationships   • Social connections:     Talks on phone: None     Gets together: None     Attends [employer] service: None     Active member of club or organization: None     Attends meetings of clubs or organizations: None     Relationship status: None   • Intimate partner violence:     Fear of current or ex partner: None     " "Emotionally abused: None     Physically abused: None     Forced sexual activity: None   Other Topics Concern   • None   Social History Narrative   • None      Family History   Problem Relation Age of Onset   • Heart attack Biological Father       Past Medical History:   Diagnosis Date   • Arthritis     mild   • Spinal stenosis, lumbar 2017      Patient Active Problem List   Diagnosis   • Benign essential HTN   • CKD (chronic kidney disease), stage III (CMS/[facility])   • Dyslipidemia   • High cholesterol   • Hyperlipidemia LDL goal <100   • Hypertension   • Glucose intolerance (impaired glucose tolerance)   • Hyperparathyroidism, secondary renal (CMS/[facility])      Glu: @LABVALUE(gluc)@   Last HgA1C:   Lab Results   Component Value Date    HGBA1C 5.5 09/01/2020     Last Lipid Panel:   Lab Results   Component Value Date    CHOL 175 09/01/2020    CHOL 187 02/18/2020    CHOL 189 11/15/2019     Lab Results   Component Value Date    HDL 69 09/01/2020    HDL 74 02/18/2020    HDL 76 11/15/2019     Lab Results   Component Value Date    LDLCALC 93 09/01/2020    LDLCALC 102 (H) 02/18/2020    LDLCALC 96 11/15/2019     Lab Results   Component Value Date    TRIG 70 09/01/2020    TRIG 57 02/18/2020    TRIG 85 11/15/2019     No results found for: CHOLHDL      New concerns: getting fatigue with exertion, no chest pain or sob.   Hypertension ROS: taking medications as instructed, no medication side effects noted, no chest pain on exertion, no dyspnea on exertion, no swelling of ankles.     Weight: The patient reports no significant weight change.  Diet: trying  Exercise: no times/week  Type: not much   ROS:  no polyuria or polydipsia, no chest pain, dyspnea or TIA's, no numbness, tingling or pain in extremities   All other systems reviewed and negative except as noted in the HPI.     Visit Vitals  /68 (BP Location: Right upper arm, Patient Position: Sitting)   Pulse 97   Temp 36.7 °C (98 °F) (Temporal)   Resp 12   Ht 1.74 m (5' 8.5\") "   Wt 97.3 kg (214 lb 9.6 oz)   SpO2 96%   BMI 32.16 kg/m²      Gait:  Normal  Alert and well appearing   Awake and oriented with normal affect and appropriate behavior   HEENT: Normocephalic and atraumatic, pupils equal, round, OP clear with moist mucous membranes  Neck:  supple, no thyroid enlargement, no LAD  Lungs: Normal breath sounds, lungs CTA with no RRW  Heart:Regular rate rhythm with no murmurs, gallops or rubs   Ext: No clubbing, cyanosis or edema     labs reviewed, I note that glycosylated hemoglobin normal, lipids  LDL result meets goal, HDL normal, liver functions are normal, comprehensive metabolic panel  normal and except for worsening kidney function.      1. Benign essential HTN  Continue current medications, recheck 6 months.  Treatment goals reviewed.  Discussed all medications with patient.  Patient verbalized understanding.   - hydrochlorothiazide (HYDRODIURIL) 25 mg tablet; Take 1 tablet (25 mg total) by mouth once daily.  Dispense: 90 tablet; Refill: 1    2. CKD (chronic kidney disease), stage III (CMS/HCC)  Will send labs to [facility]    3. Hyperlipidemia LDL goal <100  Continue current medications, recheck 6 months.  Treatment goals reviewed.  Discussed all medications with patient.  Patient verbalized understanding.     4. Hyperparathyroidism, secondary renal (CMS/HCC)  Per nephro    5. Other fatigue   sleeping well and no cardiac symptoms.  - CBC and differential; Future  - TSH w reflex FT4; Future  - Lyme Disease Antibodies (IgG, IgM),; Future  - CBC and differential  - TSH w reflex FT4  - Lyme Disease Antibodies (IgG, IgM),

## 2020-10-02 LAB
BASOPHILS # BLD AUTO: 0.1 X10E3/UL (ref 0–0.2)
BASOPHILS NFR BLD AUTO: 1 %
EOSINOPHIL # BLD AUTO: 0.5 X10E3/UL (ref 0–0.4)
EOSINOPHIL NFR BLD AUTO: 6 %
ERYTHROCYTE [DISTWIDTH] IN BLOOD BY AUTOMATED COUNT: 12.5 % (ref 11.6–15.4)
HCT VFR BLD AUTO: 44.7 % (ref 37.5–51)
HGB BLD-MCNC: 15 G/DL (ref 13–17.7)
IMM GRANULOCYTES # BLD AUTO: 0.1 X10E3/UL (ref 0–0.1)
IMM GRANULOCYTES NFR BLD AUTO: 1 %
LYMPHOCYTES # BLD AUTO: 1.5 X10E3/UL (ref 0.7–3.1)
LYMPHOCYTES NFR BLD AUTO: 21 %
MCH RBC QN AUTO: 30.1 PG (ref 26.6–33)
MCHC RBC AUTO-ENTMCNC: 33.6 G/DL (ref 31.5–35.7)
MCV RBC AUTO: 90 FL (ref 79–97)
MONOCYTES # BLD AUTO: 0.6 X10E3/UL (ref 0.1–0.9)
MONOCYTES NFR BLD AUTO: 8 %
NEUTROPHILS # BLD AUTO: 4.7 X10E3/UL (ref 1.4–7)
NEUTROPHILS NFR BLD AUTO: 63 %
PLATELET # BLD AUTO: 255 X10E3/UL (ref 150–450)
RBC # BLD AUTO: 4.99 X10E6/UL (ref 4.14–5.8)
WBC # BLD AUTO: 7.3 X10E3/UL (ref 3.4–10.8)

## 2020-10-03 LAB
B BURGDOR IGG PATRN SER IB-IMP: NEGATIVE
B BURGDOR IGM PATRN SER IB-IMP: NEGATIVE
B BURGDOR18KD IGG SER QL IB: PRESENT
B BURGDOR23KD IGG SER QL IB: PRESENT
B BURGDOR23KD IGM SER QL IB: PRESENT
B BURGDOR28KD IGG SER QL IB: ABNORMAL
B BURGDOR30KD IGG SER QL IB: ABNORMAL
B BURGDOR39KD IGG SER QL IB: PRESENT
B BURGDOR39KD IGM SER QL IB: ABNORMAL
B BURGDOR41KD IGG SER QL IB: ABNORMAL
B BURGDOR41KD IGM SER QL IB: ABNORMAL
B BURGDOR45KD IGG SER QL IB: ABNORMAL
B BURGDOR58KD IGG SER QL IB: PRESENT
B BURGDOR66KD IGG SER QL IB: ABNORMAL
B BURGDOR93KD IGG SER QL IB: ABNORMAL
T4 FREE SERPL-MCNC: 1.24 NG/DL (ref 0.82–1.77)
TSH SERPL DL<=0.005 MIU/L-ACNC: 3.24 UIU/ML (ref 0.45–4.5)

## 2021-02-19 ENCOUNTER — TELEPHONE (OUTPATIENT)
Dept: FAMILY MEDICINE | Facility: CLINIC | Age: 86
End: 2021-02-19

## 2021-02-19 DIAGNOSIS — N18.31 STAGE 3A CHRONIC KIDNEY DISEASE (CMS/HCC): ICD-10-CM

## 2021-02-19 DIAGNOSIS — R73.02 GLUCOSE INTOLERANCE (IMPAIRED GLUCOSE TOLERANCE): ICD-10-CM

## 2021-02-19 DIAGNOSIS — E78.5 HYPERLIPIDEMIA LDL GOAL <100: Primary | ICD-10-CM

## 2021-02-19 NOTE — TELEPHONE ENCOUNTER
Pt calling to see if due for labs for annual wellness please let him know. If needed please order for lab jimenez and email to him at [email or web address]. thanks

## 2021-03-09 LAB
ALBUMIN SERPL-MCNC: 4.2 G/DL (ref 3.6–4.6)
ALBUMIN/GLOB SERPL: 1.8 {RATIO} (ref 1.2–2.2)
ALP SERPL-CCNC: 91 IU/L (ref 39–117)
ALT SERPL-CCNC: 12 IU/L (ref 0–44)
AST SERPL-CCNC: 21 IU/L (ref 0–40)
BILIRUB SERPL-MCNC: 0.5 MG/DL (ref 0–1.2)
BUN SERPL-MCNC: 35 MG/DL (ref 8–27)
BUN/CREAT SERPL: 19 (ref 10–24)
CALCIUM SERPL-MCNC: 9 MG/DL (ref 8.6–10.2)
CHLORIDE SERPL-SCNC: 105 MMOL/L (ref 96–106)
CHOLEST SERPL-MCNC: 186 MG/DL (ref 100–199)
CO2 SERPL-SCNC: 23 MMOL/L (ref 20–29)
CREAT SERPL-MCNC: 1.81 MG/DL (ref 0.76–1.27)
GLOBULIN SER CALC-MCNC: 2.4 G/DL (ref 1.5–4.5)
GLUCOSE SERPL-MCNC: 101 MG/DL (ref 65–99)
HBA1C MFR BLD: 5.5 % (ref 4.8–5.6)
HDLC SERPL-MCNC: 76 MG/DL
LAB CORP EGFR IF AFRICN AM: 39 ML/MIN/1.73
LAB CORP EGFR IF NONAFRICN AM: 33 ML/MIN/1.73
LDLC SERPL CALC-MCNC: 98 MG/DL (ref 0–99)
POTASSIUM SERPL-SCNC: 4.2 MMOL/L (ref 3.5–5.2)
PROT SERPL-MCNC: 6.6 G/DL (ref 6–8.5)
SODIUM SERPL-SCNC: 140 MMOL/L (ref 134–144)
TRIGL SERPL-MCNC: 64 MG/DL (ref 0–149)
VLDLC SERPL CALC-MCNC: 12 MG/DL (ref 5–40)

## 2021-03-11 ENCOUNTER — OFFICE VISIT (OUTPATIENT)
Dept: FAMILY MEDICINE | Facility: CLINIC | Age: 86
End: 2021-03-11
Payer: COMMERCIAL

## 2021-03-11 VITALS
TEMPERATURE: 96.8 F | WEIGHT: 217 LBS | HEART RATE: 95 BPM | BODY MASS INDEX: 32.14 KG/M2 | SYSTOLIC BLOOD PRESSURE: 122 MMHG | DIASTOLIC BLOOD PRESSURE: 80 MMHG | HEIGHT: 69 IN | OXYGEN SATURATION: 98 % | RESPIRATION RATE: 16 BRPM

## 2021-03-11 DIAGNOSIS — Z00.00 ROUTINE GENERAL MEDICAL EXAMINATION AT A HEALTH CARE FACILITY: Primary | ICD-10-CM

## 2021-03-11 DIAGNOSIS — E66.811 CLASS 1 OBESITY DUE TO EXCESS CALORIES WITH SERIOUS COMORBIDITY AND BODY MASS INDEX (BMI) OF 32.0 TO 32.9 IN ADULT: ICD-10-CM

## 2021-03-11 DIAGNOSIS — E78.5 HYPERLIPIDEMIA LDL GOAL <100: ICD-10-CM

## 2021-03-11 DIAGNOSIS — N25.81 HYPERPARATHYROIDISM, SECONDARY RENAL (CMS/HCC): ICD-10-CM

## 2021-03-11 DIAGNOSIS — I10 BENIGN ESSENTIAL HTN: ICD-10-CM

## 2021-03-11 DIAGNOSIS — R73.02 GLUCOSE INTOLERANCE (IMPAIRED GLUCOSE TOLERANCE): ICD-10-CM

## 2021-03-11 DIAGNOSIS — R35.1 NOCTURIA: ICD-10-CM

## 2021-03-11 DIAGNOSIS — N18.32 STAGE 3B CHRONIC KIDNEY DISEASE (CMS/HCC): ICD-10-CM

## 2021-03-11 DIAGNOSIS — E66.09 CLASS 1 OBESITY DUE TO EXCESS CALORIES WITH SERIOUS COMORBIDITY AND BODY MASS INDEX (BMI) OF 32.0 TO 32.9 IN ADULT: ICD-10-CM

## 2021-03-11 PROCEDURE — 99397 PER PM REEVAL EST PAT 65+ YR: CPT | Performed by: FAMILY MEDICINE

## 2021-03-11 RX ORDER — AMLODIPINE BESYLATE 2.5 MG/1
2.5 TABLET ORAL DAILY
Qty: 90 TABLET | Refills: 1 | Status: SHIPPED | OUTPATIENT
Start: 2021-03-11 | End: 2021-09-23 | Stop reason: SDUPTHER

## 2021-03-11 NOTE — PROGRESS NOTES
"SUBJECTIVE:   86 y.o. male for checkup    Current Outpatient Medications:   •  amLODIPine (NORVASC) 2.5 mg tablet, Take 2.5 mg by mouth daily., Disp: , Rfl:   •  aspirin 81 mg enteric coated tablet, 81 mg., Disp: , Rfl:   •  cetirizine (ZyrTEC) 10 mg tablet, 10 mg., Disp: , Rfl:   •  hydrochlorothiazide (HYDRODIURIL) 25 mg tablet, Take 1 tablet (25 mg total) by mouth once daily., Disp: 90 tablet, Rfl: 1  •  multivitamin with iron tablet, Take by mouth daily., Disp: , Rfl:     Allergies: No known allergies   No LMP for male patient.    New Concerns: none  Other doctors:  See list    Review of risk factors for depression:   Over the past 2 weeks, have you felt down, depressed or hopeless? no  Over the past 2 weeks, have you felt little interest or pleasure in doing things? no     ROS:  Feeling well. No dyspnea or chest pain on exertion.  No abdominal pain, change in bowel habits, black or bloody stools.  No urinary tract symptoms.     Weight: The patient reports no significant weight change.  Diet: not great  Exercise: no times/week  Type:       OBJECTIVE:  Visit Vitals  /80   Pulse 95   Temp (!) 36 °C (96.8 °F)   Resp 16   Ht 1.74 m (5' 8.5\")   Wt 98.4 kg (217 lb)   SpO2 98%   BMI 32.51 kg/m²      Gait:  Normal  Alert and well appearing   Awake and oriented with normal affect and appropriate behavior   HEENT: Normocephalic and atraumatic, pupils equal, round, OP clear with moist mucous membranes  Neck:  supple, no thyroid enlargement, no LAD  Lungs: Normal breath sounds, lungs CTA with no RRW  Heart:Regular rate rhythm with no murmurs, gallops or rubs   Abd: Soft, non-tender, normal bowel sounds; no bruits, organomegaly or masses.  Ext: No clubbing, cyanosis or edema   Neuro: Gait normal, DTR 2/4 Patellar bilaterally, CN II-XII intact  + DP pulses  Skin:  No lesions on exposed skin    labs reviewed, I note that glycosylated hemoglobin normal, lipids  LDL result meets goal, HDL normal, triglycerides normal, " renal functions  mildly abnormal but acceptable, comprehensive metabolic panel  mildly abnormal but acceptable.      Preventive Services:   Adult DTaP:     Pneumovax: Up to date    Flu Vaccine advised: Fall/Now/UTD:     Zostervax/Shingrix: get at pharmacy       Health Maintenance up to date:  PSA:  ordered  Dexascan: no  Colonoscopy: no    1. Routine general medical examination at a health care facility       2. Benign essential HTN  Continue current medications, recheck 6 months.  Treatment goals reviewed.  Discussed all medications with patient.  Patient verbalized understanding.   - amLODIPine (NORVASC) 2.5 mg tablet; Take 1 tablet (2.5 mg total) by mouth daily.  Dispense: 90 tablet; Refill: 1    3. Stage 3b chronic kidney disease (CMS/HCC)  Sees renal  - Comprehensive metabolic panel; Future  - Comprehensive metabolic panel    4. Hyperparathyroidism, secondary renal (CMS/HCC)  Per renal    5. Hyperlipidemia LDL goal <100  Continue current medications, recheck 6 months.  Treatment goals reviewed.  Discussed all medications with patient.  Patient verbalized understanding.   - Comprehensive metabolic panel; Future  - Lipid panel; Future  - Comprehensive metabolic panel  - Lipid panel    6. Glucose intolerance (impaired glucose tolerance)  The patient is asked to make an attempt to improve diet and exercise patterns to aid in medical management of this problem.   - Hemoglobin A1c; Future  - Hemoglobin A1c    7. Nocturia     - PSA; Future  - PSA    8. Class 1 obesity due to excess calories with serious comorbidity and body mass index (BMI) of 32.0 to 32.9 in adult  The patient is asked to make an attempt to improve diet and exercise patterns to aid in medical management of this problem.

## 2021-03-29 ENCOUNTER — DOCUMENTATION (OUTPATIENT)
Dept: CASE MANAGEMENT | Facility: CLINIC | Age: 86
End: 2021-03-29

## 2021-04-13 DIAGNOSIS — Z23 ENCOUNTER FOR IMMUNIZATION: ICD-10-CM

## 2021-09-09 LAB
ALBUMIN SERPL-MCNC: 4.2 G/DL (ref 3.6–4.6)
ALBUMIN/GLOB SERPL: 1.8 {RATIO} (ref 1.2–2.2)
ALP SERPL-CCNC: 83 IU/L (ref 48–121)
ALT SERPL-CCNC: 11 IU/L (ref 0–44)
AST SERPL-CCNC: 19 IU/L (ref 0–40)
BILIRUB SERPL-MCNC: 0.5 MG/DL (ref 0–1.2)
BUN SERPL-MCNC: 37 MG/DL (ref 8–27)
BUN/CREAT SERPL: 20 (ref 10–24)
CALCIUM SERPL-MCNC: 9 MG/DL (ref 8.6–10.2)
CHLORIDE SERPL-SCNC: 106 MMOL/L (ref 96–106)
CHOLEST SERPL-MCNC: 199 MG/DL (ref 100–199)
CO2 SERPL-SCNC: 22 MMOL/L (ref 20–29)
CREAT SERPL-MCNC: 1.89 MG/DL (ref 0.76–1.27)
GLOBULIN SER CALC-MCNC: 2.3 G/DL (ref 1.5–4.5)
GLUCOSE SERPL-MCNC: 104 MG/DL (ref 65–99)
HBA1C MFR BLD: 5.5 % (ref 4.8–5.6)
HDLC SERPL-MCNC: 72 MG/DL
LAB CORP EGFR IF AFRICN AM: 36 ML/MIN/1.73
LAB CORP EGFR IF NONAFRICN AM: 31 ML/MIN/1.73
LDLC SERPL CALC-MCNC: 114 MG/DL (ref 0–99)
POTASSIUM SERPL-SCNC: 4.5 MMOL/L (ref 3.5–5.2)
PROT SERPL-MCNC: 6.5 G/DL (ref 6–8.5)
PSA SERPL-MCNC: 1.7 NG/ML (ref 0–4)
SODIUM SERPL-SCNC: 141 MMOL/L (ref 134–144)
TRIGL SERPL-MCNC: 70 MG/DL (ref 0–149)
VLDLC SERPL CALC-MCNC: 13 MG/DL (ref 5–40)

## 2021-09-23 ENCOUNTER — OFFICE VISIT (OUTPATIENT)
Dept: FAMILY MEDICINE | Facility: CLINIC | Age: 86
End: 2021-09-23
Payer: COMMERCIAL

## 2021-09-23 VITALS
WEIGHT: 222 LBS | OXYGEN SATURATION: 98 % | BODY MASS INDEX: 32.88 KG/M2 | DIASTOLIC BLOOD PRESSURE: 86 MMHG | RESPIRATION RATE: 16 BRPM | HEIGHT: 69 IN | TEMPERATURE: 97.6 F | HEART RATE: 88 BPM | SYSTOLIC BLOOD PRESSURE: 146 MMHG

## 2021-09-23 DIAGNOSIS — R73.02 GLUCOSE INTOLERANCE (IMPAIRED GLUCOSE TOLERANCE): ICD-10-CM

## 2021-09-23 DIAGNOSIS — N25.81 HYPERPARATHYROIDISM, SECONDARY RENAL (CMS/HCC): ICD-10-CM

## 2021-09-23 DIAGNOSIS — N18.32 STAGE 3B CHRONIC KIDNEY DISEASE (CMS/HCC): ICD-10-CM

## 2021-09-23 DIAGNOSIS — I10 BENIGN ESSENTIAL HTN: Primary | ICD-10-CM

## 2021-09-23 DIAGNOSIS — E78.5 HYPERLIPIDEMIA LDL GOAL <100: ICD-10-CM

## 2021-09-23 PROCEDURE — 90471 IMMUNIZATION ADMIN: CPT | Performed by: FAMILY MEDICINE

## 2021-09-23 PROCEDURE — 90694 VACC AIIV4 NO PRSRV 0.5ML IM: CPT | Performed by: FAMILY MEDICINE

## 2021-09-23 PROCEDURE — 3008F BODY MASS INDEX DOCD: CPT | Performed by: FAMILY MEDICINE

## 2021-09-23 PROCEDURE — 99214 OFFICE O/P EST MOD 30 MIN: CPT | Mod: 25 | Performed by: FAMILY MEDICINE

## 2021-09-23 RX ORDER — AMLODIPINE BESYLATE 2.5 MG/1
2.5 TABLET ORAL DAILY
Qty: 90 TABLET | Refills: 1 | Status: SHIPPED | OUTPATIENT
Start: 2021-09-23 | End: 2023-10-16 | Stop reason: DRUGHIGH

## 2021-09-23 NOTE — PROGRESS NOTES
"    New concerns: had to go UC with wife   Hypertension ROS: taking medications as instructed, no medication side effects noted, no chest pain on exertion, no dyspnea on exertion, no swelling of ankles.     Diet:  Working on it  Weight: up 5.  Exercise:  Very little  Review of Systems - All other systems reviewed and negative except as noted in the HPI.       Visit Vitals  BP (!) 146/86   Pulse 88   Temp 36.4 °C (97.6 °F)   Resp 16   Ht 1.74 m (5' 8.5\")   Wt 101 kg (222 lb)   SpO2 98%   BMI 33.26 kg/m²      Gait:  Normal  Alert and well appearing   Awake and oriented with normal affect and appropriate behavior   HEENT: Normocephalic and atraumatic, pupils equal, round, OP clear with moist mucous membranes  Neck:  supple, no thyroid enlargement, no LAD  Lungs: Normal breath sounds, lungs CTA with no RRW  Heart:Regular rate rhythm with no murmurs, gallops or rubs   Ext: No clubbing, cyanosis or edema     labs reviewed, I note that glycosylated hemoglobin normal, lipids  LDL result does not yet meet goal, HDL normal, triglycerides normal, comprehensive metabolic panel  mildly abnormal but acceptable and renal function mildly abnormal but stable, PSA  normal.     1. Benign essential HTN  Continue current medications, recheck 6 months.  Treatment goals reviewed.  Discussed all medications with patient.  Patient verbalized understanding.   - Comprehensive metabolic panel; Future  - CBC and differential; Future  - Comprehensive metabolic panel  - CBC and differential  - amLODIPine (NORVASC) 2.5 mg tablet; Take 1 tablet (2.5 mg total) by mouth daily.  Dispense: 90 tablet; Refill: 1    2. Hyperparathyroidism, secondary renal (CMS/HCC)  Per [facility]    3. Stage 3b chronic kidney disease (CMS/HCC)  Per [facility]  - Comprehensive metabolic panel; Future  - Comprehensive metabolic panel    4. Glucose intolerance (impaired glucose tolerance)  The patient is asked to make an attempt to improve diet and exercise patterns to aid in " medical management of this problem.   - Hemoglobin A1c; Future  - Hemoglobin A1c    5. Hyperlipidemia LDL goal <100  Continue current medications, recheck 6 months.  Treatment goals reviewed.  Discussed all medications with patient.  Patient verbalized understanding.   - Lipid panel; Future  - Lipid panel

## 2022-03-11 LAB
ALBUMIN SERPL-MCNC: 4 G/DL (ref 3.6–4.6)
ALBUMIN/GLOB SERPL: 1.8 {RATIO} (ref 1.2–2.2)
ALP SERPL-CCNC: 78 IU/L (ref 44–121)
ALT SERPL-CCNC: 9 IU/L (ref 0–44)
AST SERPL-CCNC: 15 IU/L (ref 0–40)
BASOPHILS # BLD AUTO: 0.1 X10E3/UL (ref 0–0.2)
BASOPHILS NFR BLD AUTO: 1 %
BILIRUB SERPL-MCNC: 0.5 MG/DL (ref 0–1.2)
BUN SERPL-MCNC: 45 MG/DL (ref 8–27)
BUN/CREAT SERPL: 22 (ref 10–24)
CALCIUM SERPL-MCNC: 9.1 MG/DL (ref 8.6–10.2)
CHLORIDE SERPL-SCNC: 102 MMOL/L (ref 96–106)
CHOLEST SERPL-MCNC: 202 MG/DL (ref 100–199)
CO2 SERPL-SCNC: 19 MMOL/L (ref 20–29)
CREAT SERPL-MCNC: 2.08 MG/DL (ref 0.76–1.27)
EGFRCR SERPLBLD CKD-EPI 2021: 30 ML/MIN/1.73
EOSINOPHIL # BLD AUTO: 0.4 X10E3/UL (ref 0–0.4)
EOSINOPHIL NFR BLD AUTO: 5 %
ERYTHROCYTE [DISTWIDTH] IN BLOOD BY AUTOMATED COUNT: 12.8 % (ref 11.6–15.4)
GLOBULIN SER CALC-MCNC: 2.2 G/DL (ref 1.5–4.5)
GLUCOSE SERPL-MCNC: 93 MG/DL (ref 65–99)
HBA1C MFR BLD: 5.3 % (ref 4.8–5.6)
HCT VFR BLD AUTO: 45.8 % (ref 37.5–51)
HDLC SERPL-MCNC: 60 MG/DL
HGB BLD-MCNC: 15 G/DL (ref 13–17.7)
IMM GRANULOCYTES # BLD AUTO: 0.1 X10E3/UL (ref 0–0.1)
IMM GRANULOCYTES NFR BLD AUTO: 1 %
LDLC SERPL CALC-MCNC: 128 MG/DL (ref 0–99)
LYMPHOCYTES # BLD AUTO: 1.2 X10E3/UL (ref 0.7–3.1)
LYMPHOCYTES NFR BLD AUTO: 16 %
MCH RBC QN AUTO: 29.6 PG (ref 26.6–33)
MCHC RBC AUTO-ENTMCNC: 32.8 G/DL (ref 31.5–35.7)
MCV RBC AUTO: 90 FL (ref 79–97)
MONOCYTES # BLD AUTO: 0.6 X10E3/UL (ref 0.1–0.9)
MONOCYTES NFR BLD AUTO: 8 %
NEUTROPHILS # BLD AUTO: 5.2 X10E3/UL (ref 1.4–7)
NEUTROPHILS NFR BLD AUTO: 69 %
PLATELET # BLD AUTO: 261 X10E3/UL (ref 150–450)
POTASSIUM SERPL-SCNC: 4.5 MMOL/L (ref 3.5–5.2)
PROT SERPL-MCNC: 6.2 G/DL (ref 6–8.5)
RBC # BLD AUTO: 5.07 X10E6/UL (ref 4.14–5.8)
SODIUM SERPL-SCNC: 138 MMOL/L (ref 134–144)
TRIGL SERPL-MCNC: 80 MG/DL (ref 0–149)
VLDLC SERPL CALC-MCNC: 14 MG/DL (ref 5–40)
WBC # BLD AUTO: 7.5 X10E3/UL (ref 3.4–10.8)

## 2022-03-24 ENCOUNTER — OFFICE VISIT (OUTPATIENT)
Dept: FAMILY MEDICINE | Facility: CLINIC | Age: 87
End: 2022-03-24
Payer: COMMERCIAL

## 2022-03-24 VITALS
HEART RATE: 85 BPM | BODY MASS INDEX: 31.31 KG/M2 | HEIGHT: 68 IN | TEMPERATURE: 98.2 F | OXYGEN SATURATION: 97 % | RESPIRATION RATE: 16 BRPM | SYSTOLIC BLOOD PRESSURE: 140 MMHG | WEIGHT: 206.6 LBS | DIASTOLIC BLOOD PRESSURE: 82 MMHG

## 2022-03-24 DIAGNOSIS — N18.32 STAGE 3B CHRONIC KIDNEY DISEASE (CMS/HCC): ICD-10-CM

## 2022-03-24 DIAGNOSIS — N25.81 HYPERPARATHYROIDISM, SECONDARY RENAL (CMS/HCC): ICD-10-CM

## 2022-03-24 DIAGNOSIS — Z00.00 ROUTINE GENERAL MEDICAL EXAMINATION AT A HEALTH CARE FACILITY: Primary | ICD-10-CM

## 2022-03-24 DIAGNOSIS — R73.02 GLUCOSE INTOLERANCE (IMPAIRED GLUCOSE TOLERANCE): ICD-10-CM

## 2022-03-24 DIAGNOSIS — E78.5 HYPERLIPIDEMIA LDL GOAL <100: ICD-10-CM

## 2022-03-24 PROCEDURE — 99397 PER PM REEVAL EST PAT 65+ YR: CPT | Performed by: FAMILY MEDICINE

## 2022-03-24 PROCEDURE — 3008F BODY MASS INDEX DOCD: CPT | Performed by: FAMILY MEDICINE

## 2022-03-24 ASSESSMENT — MINI COG
COMPLETED: YES
TOTAL SCORE: 4

## 2022-03-24 ASSESSMENT — PATIENT HEALTH QUESTIONNAIRE - PHQ9: SUM OF ALL RESPONSES TO PHQ9 QUESTIONS 1 & 2: 0

## 2022-03-24 NOTE — PATIENT INSTRUCTIONS
Your Personalized Prevention Plan Services (PPPS)    Preventive Services Checklist (Assumes Average Risk Unless Otherwise Noted):    Health Maintenance Topics with due status: Overdue       Topic Date Due    Zoster Vaccine 04/05/2012    DTaP, Tdap, and Td Vaccines 11/01/2015     Health Maintenance Topics with due status: Completed       Topic Last Completion Date    Pneumococcal (65 years and older) 01/09/2019    Influenza Vaccine 09/23/2021    COVID-19 Vaccine 10/29/2021    Annual Falls Risk Screening 03/23/2022     Health Maintenance Topics with due status: Aged Out       Topic Date Due    Meningococcal ACWY Aged Out    HIB Vaccines Aged Out    IPV Vaccines Aged Out    HPV Vaccines Aged Out    Pneumococcal Aged Out     Health Maintenance Topics with due status: Discontinued       Topic Date Due    Hepatitis C Screening Discontinued       You May Be Eligible for These Additional Preventive Services   (Assumes Average Risk Unless Otherwise Noted)  Diabetes Screening Any 1 risk factor: hypertension, dyslipidemia, obesity, high glucose; or Any 2 risk factors: >=64yo, overweight, family history diabetes (covered every 6 months)   Hepatitis C Screening Any 1 risk factor: 1) blood transfusion before 1992,   2) current or past injection drug use (annually for high risk; if born between 6071-[birth year]93, see above for status).   Vaccine: Hepatitis B As necessary if at-risk: hemophilia, ESRD, diabetes, living with individual infected with hep B, healthcare worker with frequent contact with blood/bodily fluids (series covered once)   Sexually Transmitted Diseases (STDs) As necessary chlamydia, gonorrhea, syphilis, hepatitis B (covered annually)  HIV if any 1 risk factor present: 1) <14yo or >64yo and at increased risk or 2) 15-64yo and ask for it (covered annually)   Lung Cancer Screening Low dose chest CT if all three risk factors: 1) 50-78yo, 2) smoker or quit within last 15y, 3) >=20 pack years (covered  annually).  No results found for this or any previous visit.       Cholesterol Screening No signs or symptoms of cardiovascular disease (screening covered every 5 years).     Prostate Cancer Screening >= 49yo if patient desires test after weighting potential harms and benefits (covered annually)         Health Risk Factors with Personalized Education:  ----------------------------------------------------------------------------------------------------------------------  Controlling Your Blood Pressure  · Maintain a normal weight (body mass index between 18.5 and 24.9).  · Eat more fruit, vegetables and low-fat dairy.  · Eat less saturated fat and total fat.  · Lower your sodium (salt) intake.  Try to stay under 1500 mg per day, but if you cannot get your intake to be that low, at least lower it by 1000 mg.  · Stay active.  Try to get at least 90 to 150 minutes of exercise per week.  Try brisk walking, swimming, bicycling or dancing.  · Limit alcohol intake.  When you do consume alcohol, drink no more than 2 drinks per day.  · If you have been prescribed medication, take it regularly and exactly as prescribed.  Let your PCP know if you have any problems or questions about your medication.  · Check your blood pressure at home or at the store.  Write down your readings and share them with your PCP  ----------------------------------------------------------------------------------------------------------------------  Controlling Your Glucose (Sugar) Levels  · Stress can raise your blood sugar.  Learn what causes your stress.  Learn to lower your stress by spending time with family and friends, exercising, deep breathing, trying meditation or yoga, enjoying hobbies and getting enough rest.  Let your PCP know if you’re having problems limiting your stress.  · Maintain a healthy weight by balancing your diet and exercise.  · Choose foods that are lower in calories, saturated fat, trans fat, sugar and salt.  Eat foods with  more fiber, like whole grain cereals, bread, crackers, rice or pasta.  Choose to eat fruit, vegetables, and low-fat or fat-free/skim dairy.  · Reduce the portion size of your meals.  Make half of your meal vegetables and fruit, and divide the other half between lean protein and whole grains.  · Drink water instead of juice and regular soda.  · Spend at least some time being active on most days of the week.  · Work to increase your muscle strength at least twice per week.  Try things like light weights, stretch bands, yoga, heavy gardening (digging, planting with tools) or push-ups  ----------------------------------------------------------------------------------------------------------------------  Controlling Your Cholesterol  · Reduce the amount of saturated and trans fat in your diet.  Limit intake of red meat.  Consume only low-fat or non-fat/skim dairy.  Limit fried food.  Cook with vegetable oils.  · Reduce your intake of sugary foods, sugary drinks and alcohol.  · Eat a diet high in fruit, vegetables and whole grains.  · Get protein from fish, poultry and a small portion of nuts.  · Stay active.  Try to get at least 90 to 150 minutes of exercise per week.  Try brisk walking, swimming, bicycling or dancing.  · Maintain a healthy weight by balancing your diet and exercise.  · If you have been prescribed medication, take it regularly and exactly as prescribed. Let your PCP know if you have any problems or questions about your medication.  · It’s important to know your cholesterol numbers.  When recommended by your PCP, get the cholesterol blood test.  ----------------------------------------------------------------------------------------------------------------------  Reducing Your Risk of Falls  · Tell your PCP if any of your medications make you feel tired, dizzy, lightheaded or off-balance.  · Maintain coordination, flexibility and balance by ensuring regular physical activity.  · Limit alcohol intake to 1  drink per day.  Consider avoiding all alcohol intake.  · Ensure good vision.  Visit an ophthalmologist or optometrist regularly for vision screening or to make sure your glasses / contact lens prescription is correct.  If you need glasses or contacts, wear them.  When you get new glasses or contacts, take time to get used to them.  Do not wear sunglasses or tinted lenses when indoors.  · Ensure good hearing.  Have your hearing checked if you are having trouble hearing, or family and friends think you cannot hear them.  If you need a hearing aid, be sure it fits well and wear it.  · Get enough rest.  Ensure about 7-9 hours of sleep every day.  · Get up slowly from your bed or chairs.  Do not start walking until you are sure you feel steady.  · Wear non-skid, rubber-soled, low-heeled shoes.  Do not walk in socks, or in shoes and slippers with smooth soles.  · If your PCP or therapist recommends using a cane or walker, use it regularly.  · Make your home safer.  Increase lighting throughout the house, especially at the top and bottom of stairs.  Ensure lighting is easily turned on when getting up in the middle of the night.  Make sure there are two secure rails on all stairs.  Install grab bars in the bathtub / shower and near the toilet.  Consider using a shower chair and / or a hand-held shower.  · Spread sand or salt on icy surfaces.  Beware of wet surfaces, which can be icy.  · Tell your PCP if you have fallen.

## 2022-03-24 NOTE — PROGRESS NOTES
Subjective     Den Ramirez is a 87 y.o. male who presents for an initial annual wellness visit.     Patient Care Team:  Vera Whitten DO as PCP - General  Ilan Steinberg MD as Referring Physician (Nephrology)  Freddy Luna MD as Referring Physician (Interventional Cardiology)    Comprehensive Medical and Social History  Patient Active Problem List   Diagnosis   • Benign essential HTN   • CKD (chronic kidney disease), stage III (CMS/HCC)   • Dyslipidemia   • High cholesterol   • Hyperlipidemia LDL goal <100   • Hypertension   • Glucose intolerance (impaired glucose tolerance)   • Hyperparathyroidism, secondary renal (CMS/HCC)     Past Medical History:   Diagnosis Date   • Arthritis     mild   • Spinal stenosis, lumbar 2017     History reviewed. No pertinent surgical history.  Allergies   Allergen Reactions   • No Known Allergies      Current Outpatient Medications   Medication Sig Dispense Refill   • amLODIPine (NORVASC) 2.5 mg tablet Take 1 tablet (2.5 mg total) by mouth daily. 90 tablet 1   • aspirin 81 mg enteric coated tablet 81 mg.     • cetirizine (ZyrTEC) 10 mg tablet 10 mg.     • hydrochlorothiazide (HYDRODIURIL) 25 mg tablet Take 1 tablet (25 mg total) by mouth once daily. 90 tablet 1   • multivitamin with iron tablet Take by mouth daily.       No current facility-administered medications for this visit.     Social History     Tobacco Use   • Smoking status: Former Smoker   • Smokeless tobacco: Never Used   • Tobacco comment: no longer cigars   Substance Use Topics   • Alcohol use: Yes     Alcohol/week: 7.0 standard drinks     Types: 7 Shots of liquor per week     Comment: ocassionally   • Drug use: No     Family History   Problem Relation Age of Onset   • Heart attack Biological Father        Objective   Vitals  Vitals:    03/24/22 0847 03/24/22 0904   BP: (!) 150/102 140/82   BP Location: Right upper arm    Patient Position: Sitting    Pulse: 85    Resp: 16    Temp: 36.8 °C (98.2 °F)    TempSrc:  "Temporal    SpO2: 97%    Weight: 93.7 kg (206 lb 9.6 oz)    Height: 1.727 m (5' 8\")      Body mass index is 31.41 kg/m².    Advanced Care Plan  Does patient have advance directive?: Yes       Patient has Advance Directive: Advance Directive is NOT in chart, requested to bring in                             PHQ  Will the patient answer the depression questions?: Yes   Little interest or pleasure in doing things: Not at all   Feeling down, depressed, or hopeless: Not at all   Depression Risk: 0                                             Mini Cog  Completed: Yes  Score: 4  Result: Negative      Get Up and Go  Result: Pass    STEADI Falls Risk  One or more falls in the last year: No                                                             Hearing and Vision Screening  No exam data present  See HRA for relevant hearing screening response.      Other Providers caring for patient: see list     Diet:  improving  Activity: working on it  New Concerns: none, caring for wife with CHF   Review Of Systems   All other systems reviewed and negative except as noted in the HPI.     Visit Vitals  /82   Pulse 85   Temp 36.8 °C (98.2 °F) (Temporal)   Resp 16   Ht 1.727 m (5' 8\")   Wt 93.7 kg (206 lb 9.6 oz)   SpO2 97%   BMI 31.41 kg/m²      Gait:  Normal  Alert and well appearing   Awake and oriented with normal affect and appropriate behavior   HEENT: Normocephalic and atraumatic, pupils equal, round, OP clear with moist mucous membranes  Neck:  supple, no thyroid enlargement, no LAD  Lungs: Normal breath sounds, lungs CTA with no RRW  Heart:Regular rate rhythm with no murmurs, gallops or rubs   Ext: No clubbing, cyanosis or edema       labs reviewed, I note that glycosylated hemoglobin normal, lipids  LDL result does not yet meet goal, HDL normal, liver functions are normal, renal functions  stable and abnormal 2.08, comprehensive metabolic panel  mildly abnormal but acceptable.    Education, counseling and referral for any " pertinent health issues identified -       Preventive Services:   Adult DTaP:     Pneumovax: Up to date    Flu Vaccine advised: Fall/Now/UTD:     Zostervax/Shingrix:         Health Maintenance up to date:  Dexascan: no  Colonoscopy: no  Mammogram: no    Anticipatory Guidance   Safe Sex/STD's yes  Smoking Cessation yes  Sunscreen/ABCDs yes  Diet/Exercise yes  Recommend routing opth & dental care  yes     Assessment/Plan   Diagnoses and all orders for this visit:    Routine general medical examination at a health care facility (Primary)    Hyperparathyroidism, secondary renal (CMS/HCC)    Glucose intolerance (impaired glucose tolerance)    Hyperlipidemia LDL goal <100  -     Lipid panel; Future  -     Hepatic function panel; Future    Stage 3b chronic kidney disease (CMS/HCC)      Continue current medications, recheck 6 months.  Treatment goals reviewed.  Discussed all medications with patient.  Patient verbalized understanding.   See Patient Instructions (the written plan) which was given to the patient for PPPS and health risk factors with interventions.

## 2022-09-22 LAB
ALBUMIN SERPL-MCNC: 4.1 G/DL (ref 3.6–4.6)
ALP SERPL-CCNC: 72 IU/L (ref 44–121)
ALT SERPL-CCNC: 10 IU/L (ref 0–44)
AST SERPL-CCNC: 19 IU/L (ref 0–40)
BILIRUB DIRECT SERPL-MCNC: 0.15 MG/DL (ref 0–0.4)
BILIRUB SERPL-MCNC: 0.5 MG/DL (ref 0–1.2)
CHOLEST SERPL-MCNC: 201 MG/DL (ref 100–199)
HDLC SERPL-MCNC: 72 MG/DL
LDLC SERPL CALC-MCNC: 115 MG/DL (ref 0–99)
PROT SERPL-MCNC: 6.3 G/DL (ref 6–8.5)
TRIGL SERPL-MCNC: 77 MG/DL (ref 0–149)
VLDLC SERPL CALC-MCNC: 14 MG/DL (ref 5–40)

## 2022-09-26 ENCOUNTER — OFFICE VISIT (OUTPATIENT)
Dept: FAMILY MEDICINE | Facility: CLINIC | Age: 87
End: 2022-09-26
Payer: MEDICARE

## 2022-09-26 VITALS
WEIGHT: 197 LBS | BODY MASS INDEX: 29.95 KG/M2 | HEART RATE: 94 BPM | TEMPERATURE: 97.5 F | OXYGEN SATURATION: 98 % | SYSTOLIC BLOOD PRESSURE: 120 MMHG | DIASTOLIC BLOOD PRESSURE: 72 MMHG

## 2022-09-26 DIAGNOSIS — R73.02 GLUCOSE INTOLERANCE (IMPAIRED GLUCOSE TOLERANCE): ICD-10-CM

## 2022-09-26 DIAGNOSIS — I10 BENIGN ESSENTIAL HTN: Primary | ICD-10-CM

## 2022-09-26 DIAGNOSIS — E78.5 HYPERLIPIDEMIA LDL GOAL <100: ICD-10-CM

## 2022-09-26 DIAGNOSIS — Z23 NEEDS FLU SHOT: ICD-10-CM

## 2022-09-26 DIAGNOSIS — N18.32 STAGE 3B CHRONIC KIDNEY DISEASE (CMS/HCC): ICD-10-CM

## 2022-09-26 PROCEDURE — 99214 OFFICE O/P EST MOD 30 MIN: CPT | Mod: 25 | Performed by: FAMILY MEDICINE

## 2022-09-26 PROCEDURE — 200200 PR NO CHARGE: Performed by: FAMILY MEDICINE

## 2022-09-26 PROCEDURE — G0008 ADMIN INFLUENZA VIRUS VAC: HCPCS | Performed by: FAMILY MEDICINE

## 2022-09-26 PROCEDURE — 90694 VACC AIIV4 NO PRSRV 0.5ML IM: CPT | Performed by: FAMILY MEDICINE

## 2022-09-26 NOTE — PROGRESS NOTES
Prediabetes Check   Den Ramirez is a 88 y.o. male     Glu: @LABVALUE(gluc)@   Last HgA1C:   Lab Results   Component Value Date    HGBA1C 5.3 03/10/2022     Last Lipid Panel:   Lab Results   Component Value Date    CHOL 201 (H) 09/21/2022    CHOL 202 (H) 03/10/2022    CHOL 199 09/08/2021     Lab Results   Component Value Date    HDL 72 09/21/2022    HDL 60 03/10/2022    HDL 72 09/08/2021     Lab Results   Component Value Date    LDLCALC 115 (H) 09/21/2022    LDLCALC 128 (H) 03/10/2022    LDLCALC 114 (H) 09/08/2021     Lab Results   Component Value Date    TRIG 77 09/21/2022    TRIG 80 03/10/2022    TRIG 70 09/08/2021     No results found for: CHOLHDL      New concerns: wife passed away   Medication ROS: taking medications as instructed, no medication side effects noted, no chest pain on exertion, no dyspnea on exertion, no swelling of ankles.     Weight: down 10.  Diet: healthy  Exercise: multiple times/week  Type: gym   ROS:  no polyuria or polydipsia, no chest pain, dyspnea or TIA's, no numbness, tingling or pain in extremities   All other systems reviewed and negative except as noted in the HPI.     Visit Vitals  /72   Pulse 94   Temp 36.4 °C (97.5 °F)   Wt 89.4 kg (197 lb)   SpO2 98%   BMI 29.95 kg/m²      Gait:  Normal  Alert and well appearing   Awake and oriented with normal affect and appropriate behavior   HEENT: Normocephalic and atraumatic, pupils equal, round, OP clear with moist mucous membranes  Neck:  supple, no thyroid enlargement, no LAD  Lungs: Normal breath sounds, lungs CTA with no RRW  Heart:Regular rate rhythm with no murmurs, gallops or rubs   Ext: No clubbing, cyanosis or edema     labs reviewed, I note that lipids  LDL result does not yet meet goal, liver functions are normal.      Diagnosis Plan   1. Benign essential HTN  TSH w reflex FT4    CBC and differential    TSH w reflex FT4    CBC and differential   2. Stage 3b chronic kidney disease (CMS/HCC)     3. Hyperlipidemia LDL goal  <100  Lipid panel    Lipid panel   4. Glucose intolerance (impaired glucose tolerance)  Comprehensive metabolic panel    Hemoglobin A1c    Comprehensive metabolic panel    Hemoglobin A1c   5. Needs flu shot  Influenza vaccine Quad Adjuvanted 65 and Older (FluAd Quad)     Continue current medications, recheck 6 months.  Treatment goals reviewed.  Discussed all medications with patient.  Patient verbalized understanding.   If still lightheaded t/c decreasing hctz

## 2023-03-10 LAB
ALBUMIN SERPL-MCNC: 4.2 G/DL (ref 3.6–4.6)
ALBUMIN/GLOB SERPL: 1.9 {RATIO} (ref 1.2–2.2)
ALP SERPL-CCNC: 75 IU/L (ref 44–121)
ALT SERPL-CCNC: 11 IU/L (ref 0–44)
AST SERPL-CCNC: 21 IU/L (ref 0–40)
BASOPHILS # BLD AUTO: 0.1 X10E3/UL (ref 0–0.2)
BASOPHILS NFR BLD AUTO: 1 %
BILIRUB SERPL-MCNC: 0.6 MG/DL (ref 0–1.2)
BUN SERPL-MCNC: 41 MG/DL (ref 8–27)
BUN/CREAT SERPL: 19 (ref 10–24)
CALCIUM SERPL-MCNC: 9.2 MG/DL (ref 8.6–10.2)
CHLORIDE SERPL-SCNC: 102 MMOL/L (ref 96–106)
CHOLEST SERPL-MCNC: 205 MG/DL (ref 100–199)
CO2 SERPL-SCNC: 22 MMOL/L (ref 20–29)
CREAT SERPL-MCNC: 2.13 MG/DL (ref 0.76–1.27)
EGFRCR SERPLBLD CKD-EPI 2021: 29 ML/MIN/1.73
EOSINOPHIL # BLD AUTO: 0.2 X10E3/UL (ref 0–0.4)
EOSINOPHIL NFR BLD AUTO: 3 %
ERYTHROCYTE [DISTWIDTH] IN BLOOD BY AUTOMATED COUNT: 13 % (ref 11.6–15.4)
GLOBULIN SER CALC-MCNC: 2.2 G/DL (ref 1.5–4.5)
GLUCOSE SERPL-MCNC: 94 MG/DL (ref 70–99)
HBA1C MFR BLD: 5.3 % (ref 4.8–5.6)
HCT VFR BLD AUTO: 46.9 % (ref 37.5–51)
HDLC SERPL-MCNC: 74 MG/DL
HGB BLD-MCNC: 15.3 G/DL (ref 13–17.7)
IMM GRANULOCYTES # BLD AUTO: 0.1 X10E3/UL (ref 0–0.1)
IMM GRANULOCYTES NFR BLD AUTO: 1 %
LDLC SERPL CALC-MCNC: 116 MG/DL (ref 0–99)
LYMPHOCYTES # BLD AUTO: 1.4 X10E3/UL (ref 0.7–3.1)
LYMPHOCYTES NFR BLD AUTO: 20 %
MCH RBC QN AUTO: 29 PG (ref 26.6–33)
MCHC RBC AUTO-ENTMCNC: 32.6 G/DL (ref 31.5–35.7)
MCV RBC AUTO: 89 FL (ref 79–97)
MONOCYTES # BLD AUTO: 0.5 X10E3/UL (ref 0.1–0.9)
MONOCYTES NFR BLD AUTO: 8 %
NEUTROPHILS # BLD AUTO: 4.6 X10E3/UL (ref 1.4–7)
NEUTROPHILS NFR BLD AUTO: 67 %
PLATELET # BLD AUTO: 260 X10E3/UL (ref 150–450)
POTASSIUM SERPL-SCNC: 4.8 MMOL/L (ref 3.5–5.2)
PROT SERPL-MCNC: 6.4 G/DL (ref 6–8.5)
RBC # BLD AUTO: 5.27 X10E6/UL (ref 4.14–5.8)
SODIUM SERPL-SCNC: 140 MMOL/L (ref 134–144)
T4 FREE SERPL-MCNC: 1.39 NG/DL (ref 0.82–1.77)
TRIGL SERPL-MCNC: 84 MG/DL (ref 0–149)
TSH SERPL DL<=0.005 MIU/L-ACNC: 3.42 UIU/ML (ref 0.45–4.5)
VLDLC SERPL CALC-MCNC: 15 MG/DL (ref 5–40)
WBC # BLD AUTO: 6.8 X10E3/UL (ref 3.4–10.8)

## 2023-03-20 ENCOUNTER — OFFICE VISIT (OUTPATIENT)
Dept: FAMILY MEDICINE | Facility: CLINIC | Age: 88
End: 2023-03-20
Payer: MEDICARE

## 2023-03-20 VITALS
BODY MASS INDEX: 29.37 KG/M2 | WEIGHT: 193.8 LBS | HEART RATE: 89 BPM | RESPIRATION RATE: 16 BRPM | DIASTOLIC BLOOD PRESSURE: 90 MMHG | SYSTOLIC BLOOD PRESSURE: 154 MMHG | OXYGEN SATURATION: 98 % | TEMPERATURE: 97.7 F | HEIGHT: 68 IN

## 2023-03-20 DIAGNOSIS — N18.4 CHRONIC RENAL DISEASE, STAGE IV (CMS/HCC): ICD-10-CM

## 2023-03-20 DIAGNOSIS — N25.81 HYPERPARATHYROIDISM, SECONDARY RENAL (CMS/HCC): ICD-10-CM

## 2023-03-20 DIAGNOSIS — I10 BENIGN ESSENTIAL HTN: ICD-10-CM

## 2023-03-20 DIAGNOSIS — N18.32 STAGE 3B CHRONIC KIDNEY DISEASE (CMS/HCC): ICD-10-CM

## 2023-03-20 DIAGNOSIS — Z00.00 WELCOME TO MEDICARE PREVENTIVE VISIT: Primary | ICD-10-CM

## 2023-03-20 PROCEDURE — G0402 INITIAL PREVENTIVE EXAM: HCPCS | Performed by: FAMILY MEDICINE

## 2023-03-20 ASSESSMENT — MINI COG
TOTAL SCORE: 5
COMPLETED: YES

## 2023-03-20 ASSESSMENT — PATIENT HEALTH QUESTIONNAIRE - PHQ9: SUM OF ALL RESPONSES TO PHQ9 QUESTIONS 1 & 2: 0

## 2023-03-20 NOTE — PROGRESS NOTES
Subjective     Den Ramirez is a 88 y.o. male who presents for a Welcome to Medicare exam.     Patient Care Team:  Vera Whitten DO as PCP - General  Ilan Steinberg MD as Referring Physician (Nephrology)  Freddy Luna MD as Referring Physician (Interventional Cardiology)    Comprehensive Medical and Social History  Patient Active Problem List   Diagnosis   • Benign essential HTN   • CKD (chronic kidney disease), stage III (CMS/HCC)   • Dyslipidemia   • High cholesterol   • Hyperlipidemia LDL goal <100   • Hypertension   • Glucose intolerance (impaired glucose tolerance)   • Hyperparathyroidism, secondary renal (CMS/HCC)   • Chronic renal disease, stage IV (CMS/HCC)     Past Medical History:   Diagnosis Date   • Arthritis     mild   • Spinal stenosis, lumbar 2017     History reviewed. No pertinent surgical history.  Allergies   Allergen Reactions   • No Known Allergies      Current Outpatient Medications   Medication Sig Dispense Refill   • amLODIPine (NORVASC) 2.5 mg tablet Take 1 tablet (2.5 mg total) by mouth daily. 90 tablet 1   • cetirizine (ZyrTEC) 10 mg tablet 10 mg.     • hydrochlorothiazide (HYDRODIURIL) 25 mg tablet Take 1 tablet (25 mg total) by mouth once daily. 90 tablet 1   • multivitamin with iron tablet Take by mouth daily.     • aspirin 81 mg enteric coated tablet 81 mg.       No current facility-administered medications for this visit.     Social History     Socioeconomic History   • Marital status:      Spouse name: None   • Number of children: None   • Years of education: None   • Highest education level: None   Tobacco Use   • Smoking status: Some Days     Types: Cigars     Last attempt to quit: 2000     Years since quittin.2   • Smokeless tobacco: Never   • Tobacco comments:     no longer cigars   Substance and Sexual Activity   • Alcohol use: Not Currently     Alcohol/week: 3.0 - 4.0 standard drinks of alcohol     Types: 3 - 4 Standard drinks or equivalent per week   •  "Drug use: No     Family History   Problem Relation Age of Onset   • Heart attack Biological Father        Objective   Vitals  Vitals:    03/20/23 1425 03/20/23 1432 03/20/23 1447   BP: (!) 154/90 (!) 153/87 (!) 154/90   BP Location: Left upper arm Left upper arm    Patient Position: Sitting Sitting    Pulse: 89     Resp: 16     Temp: 36.5 °C (97.7 °F)     TempSrc: Temporal     SpO2: 98%     Weight: 87.9 kg (193 lb 12.8 oz)     Height: 1.727 m (5' 8\")       Body mass index is 29.47 kg/m².    Advanced Care Plan  Does patient have advance directive?: Yes       Patient has Advance Directive: Advance Directive is NOT in chart, requested to bring in                             PHQ  Will the patient answer the depression questions?: Yes   Little interest or pleasure in doing things: Not at all   Feeling down, depressed, or hopeless: Not at all   Depression Risk: 0                                                Mini Cog  Completed: Yes  Score: 5      Get Up and Go  Result: Pass    STEADI Falls Risk  One or more falls in the last year: No                                                             Hearing and Vision Screening  No results found.  See HRA for relevant hearing screening response.      Other Providers caring for patient: see list     Diet:  improving  Activity: 2-3 times a week goes to gym  New Concerns: finally retired   Review Of Systems   All other systems reviewed and negative except as noted in the HPI.     Visit Vitals  BP (!) 154/90   Pulse 89   Temp 36.5 °C (97.7 °F) (Temporal)   Resp 16   Ht 1.727 m (5' 8\")   Wt 87.9 kg (193 lb 12.8 oz)   SpO2 98%   BMI 29.47 kg/m²      Gait:  Normal  Alert and well appearing   Awake and oriented with normal affect and appropriate behavior   HEENT: Normocephalic and atraumatic, pupils equal, round, OP clear with moist mucous membranes  Neck:  supple, no thyroid enlargement, no LAD  Lungs: Normal breath sounds, lungs CTA with no RRW  Heart:Regular rate rhythm with no " murmurs, gallops or rubs   Ext: No clubbing, cyanosis or edema       labs reviewed, I note that glycosylated hemoglobin normal, lipids  LDL result does not meet goal, HDL normal, hemogram  normal, TSH  normal, comprehensive metabolic panel  abnormal GFR 29.    Education, counseling and referral for any pertinent health issues identified -       Preventive Services:   Adult DTaP:     Pneumovax: Up to date    Flu Vaccine advised: Fall/Now/UTD: Up to date    Zostervax/Shingrix:         Health Maintenance up to date:  Dexascan: no  Colonoscopy: no  Mammogram: no    Anticipatory Guidance   Safe Sex/STD's yes  Smoking Cessation yes  Sunscreen/ABCDs yes  Diet/Exercise yes  Recommend routing opth & dental care  yes      Assessment/Plan   Problem List Items Addressed This Visit        Circulatory    Benign essential HTN       Genitourinary    CKD (chronic kidney disease), stage III (CMS/HCC)    Chronic renal disease, stage IV (CMS/HCC)       Endocrine/Metabolic    Hyperparathyroidism, secondary renal (CMS/HCC)   Other Visit Diagnoses     Welcome to Medicare preventive visit    -  Primary          I will reach out to to nephro and if bp remains > 140/90 more regularly will see if Losartan is good option.    See Patient Instructions (the written plan) which was given to the patient for PPPS and health risk factors with interventions.

## 2023-10-02 ENCOUNTER — TELEPHONE (OUTPATIENT)
Dept: FAMILY MEDICINE | Facility: CLINIC | Age: 88
End: 2023-10-02
Payer: MEDICARE

## 2023-10-02 DIAGNOSIS — E78.5 HYPERLIPIDEMIA LDL GOAL <100: ICD-10-CM

## 2023-10-02 DIAGNOSIS — R73.02 GLUCOSE INTOLERANCE (IMPAIRED GLUCOSE TOLERANCE): ICD-10-CM

## 2023-10-02 DIAGNOSIS — N18.4 CHRONIC RENAL DISEASE, STAGE IV (CMS/HCC): Primary | ICD-10-CM

## 2023-10-02 NOTE — TELEPHONE ENCOUNTER
Pt would like blood work prior to appt on 10/16. He thought there was already one in the system please order and let pt know. Thanks

## 2023-10-12 LAB
ALBUMIN SERPL-MCNC: 4.3 G/DL (ref 3.7–4.7)
ALBUMIN/GLOB SERPL: 2.3 {RATIO} (ref 1.2–2.2)
ALP SERPL-CCNC: 70 IU/L (ref 44–121)
ALT SERPL-CCNC: 13 IU/L (ref 0–44)
AST SERPL-CCNC: 22 IU/L (ref 0–40)
BILIRUB SERPL-MCNC: 0.6 MG/DL (ref 0–1.2)
BUN SERPL-MCNC: 49 MG/DL (ref 8–27)
BUN/CREAT SERPL: 24 (ref 10–24)
CALCIUM SERPL-MCNC: 9 MG/DL (ref 8.6–10.2)
CHLORIDE SERPL-SCNC: 104 MMOL/L (ref 96–106)
CHOLEST SERPL-MCNC: 184 MG/DL (ref 100–199)
CO2 SERPL-SCNC: 23 MMOL/L (ref 20–29)
CREAT SERPL-MCNC: 2.07 MG/DL (ref 0.76–1.27)
EGFRCR SERPLBLD CKD-EPI 2021: 30 ML/MIN/1.73
GLOBULIN SER CALC-MCNC: 1.9 G/DL (ref 1.5–4.5)
GLUCOSE SERPL-MCNC: 91 MG/DL (ref 70–99)
HBA1C MFR BLD: 5.5 % (ref 4.8–5.6)
HDLC SERPL-MCNC: 81 MG/DL
LDLC SERPL CALC-MCNC: 92 MG/DL (ref 0–99)
POTASSIUM SERPL-SCNC: 4.4 MMOL/L (ref 3.5–5.2)
PROT SERPL-MCNC: 6.2 G/DL (ref 6–8.5)
SODIUM SERPL-SCNC: 140 MMOL/L (ref 134–144)
TRIGL SERPL-MCNC: 58 MG/DL (ref 0–149)
VLDLC SERPL CALC-MCNC: 11 MG/DL (ref 5–40)

## 2023-10-16 ENCOUNTER — OFFICE VISIT (OUTPATIENT)
Dept: FAMILY MEDICINE | Facility: CLINIC | Age: 88
End: 2023-10-16
Payer: MEDICARE

## 2023-10-16 VITALS
WEIGHT: 186 LBS | OXYGEN SATURATION: 99 % | TEMPERATURE: 97.7 F | BODY MASS INDEX: 28.19 KG/M2 | HEART RATE: 65 BPM | SYSTOLIC BLOOD PRESSURE: 140 MMHG | HEIGHT: 68 IN | RESPIRATION RATE: 16 BRPM | DIASTOLIC BLOOD PRESSURE: 82 MMHG

## 2023-10-16 DIAGNOSIS — Z23 NEED FOR INFLUENZA VACCINATION: ICD-10-CM

## 2023-10-16 DIAGNOSIS — E78.5 HYPERLIPIDEMIA LDL GOAL <100: ICD-10-CM

## 2023-10-16 DIAGNOSIS — I10 BENIGN ESSENTIAL HTN: ICD-10-CM

## 2023-10-16 DIAGNOSIS — N18.32 STAGE 3B CHRONIC KIDNEY DISEASE (CMS/HCC): Primary | ICD-10-CM

## 2023-10-16 PROCEDURE — 90694 VACC AIIV4 NO PRSRV 0.5ML IM: CPT | Performed by: FAMILY MEDICINE

## 2023-10-16 PROCEDURE — G0008 ADMIN INFLUENZA VIRUS VAC: HCPCS | Performed by: FAMILY MEDICINE

## 2023-10-16 PROCEDURE — 99214 OFFICE O/P EST MOD 30 MIN: CPT | Mod: 25 | Performed by: FAMILY MEDICINE

## 2023-10-16 RX ORDER — AMLODIPINE BESYLATE 5 MG/1
5 TABLET ORAL DAILY
Qty: 90 TABLET | Refills: 1 | Status: SHIPPED | OUTPATIENT
Start: 2023-10-16 | End: 2024-03-25

## 2023-10-16 NOTE — PROGRESS NOTES
"Prediabetes Check   Den Ramirez is a 89 y.o. male     Glu: @LABVALUE(gluc)@   Last HgA1C:   Lab Results   Component Value Date    HGBA1C 5.5 10/11/2023     Last Lipid Panel:   Lab Results   Component Value Date    CHOL 184 10/11/2023    CHOL 205 (H) 03/09/2023    CHOL 201 (H) 09/21/2022     Lab Results   Component Value Date    HDL 81 10/11/2023    HDL 74 03/09/2023    HDL 72 09/21/2022     Lab Results   Component Value Date    LDLCALC 92 10/11/2023    LDLCALC 116 (H) 03/09/2023    LDLCALC 115 (H) 09/21/2022     Lab Results   Component Value Date    TRIG 58 10/11/2023    TRIG 84 03/09/2023    TRIG 77 09/21/2022     No results found for: \"CHOLHDL\"      New concerns: nephro wants better control of bp, which has been 130-150s   Medication ROS: taking medications as instructed, no medication side effects noted, no chest pain on exertion, no dyspnea on exertion, no swelling of ankles.     Weight: down 7.  Diet: improved  Exercise: no times/week  Type: has stopped since having back issues, but is seeing pain management and will get back to gym   ROS:  no polyuria or polydipsia, no chest pain, dyspnea or TIA's, no numbness, tingling or pain in extremities   All other systems reviewed and negative except as noted in the HPI.     Visit Vitals  /82 (BP Location: Left upper arm, Patient Position: Sitting)   Pulse 65   Temp 36.5 °C (97.7 °F)   Resp 16   Ht 1.727 m (5' 8\")   Wt 84.4 kg (186 lb)   SpO2 99%   BMI 28.28 kg/m²      Gait:  Normal  Alert and well appearing   Awake and oriented with normal affect and appropriate behavior   HEENT: Normocephalic and atraumatic, pupils equal, round, OP clear with moist mucous membranes  Neck:  supple, no thyroid enlargement, no LAD  Lungs: Normal breath sounds, lungs CTA with no RRW  Heart:regular rate and rhythm, systolic murmur: early systolic 2/6, blowing at 2nd left intercostal space   Ext: No clubbing, cyanosis or edema     labs reviewed, I note that glycosylated hemoglobin " normal, lipids  LDL result meets goal, comprehensive metabolic panel  mildly abnormal but acceptable.     1. Stage 3b chronic kidney disease (CMS/HCC)  Will increase amlodipine    2. Benign essential HTN  Increase to 5  And f/u 1 to 6 sanjuanita   - amLODIPine (NORVASC) 5 mg tablet; Take 1 tablet (5 mg total) by mouth daily.  Dispense: 90 tablet; Refill: 1    3. Hyperlipidemia LDL goal <100  Continue current medications, recheck 6 months.  Treatment goals reviewed.  Discussed all medications with patient.  Patient verbalized understanding.

## 2024-01-09 ENCOUNTER — TELEPHONE (OUTPATIENT)
Dept: FAMILY MEDICINE | Facility: CLINIC | Age: 89
End: 2024-01-09
Payer: MEDICARE

## 2024-03-24 DIAGNOSIS — I10 BENIGN ESSENTIAL HTN: ICD-10-CM

## 2024-03-25 RX ORDER — AMLODIPINE BESYLATE 5 MG/1
5 TABLET ORAL DAILY
Qty: 90 TABLET | Refills: 1 | Status: SHIPPED | OUTPATIENT
Start: 2024-03-25 | End: 2024-10-01

## 2024-04-16 ENCOUNTER — OFFICE VISIT (OUTPATIENT)
Dept: FAMILY MEDICINE | Facility: CLINIC | Age: 89
End: 2024-04-16
Payer: MEDICARE

## 2024-04-16 VITALS
TEMPERATURE: 97.9 F | WEIGHT: 191.2 LBS | SYSTOLIC BLOOD PRESSURE: 134 MMHG | OXYGEN SATURATION: 98 % | DIASTOLIC BLOOD PRESSURE: 82 MMHG | HEIGHT: 68 IN | RESPIRATION RATE: 16 BRPM | BODY MASS INDEX: 28.98 KG/M2 | HEART RATE: 76 BPM

## 2024-04-16 DIAGNOSIS — R35.1 NOCTURIA: ICD-10-CM

## 2024-04-16 DIAGNOSIS — Z00.00 MEDICARE ANNUAL WELLNESS VISIT, SUBSEQUENT: Primary | ICD-10-CM

## 2024-04-16 DIAGNOSIS — N18.4 CHRONIC RENAL DISEASE, STAGE IV (CMS/HCC): ICD-10-CM

## 2024-04-16 DIAGNOSIS — N25.81 HYPERPARATHYROIDISM, SECONDARY RENAL (CMS/HCC): ICD-10-CM

## 2024-04-16 PROCEDURE — G0439 PPPS, SUBSEQ VISIT: HCPCS | Performed by: FAMILY MEDICINE

## 2024-04-16 ASSESSMENT — PATIENT HEALTH QUESTIONNAIRE - PHQ9: SUM OF ALL RESPONSES TO PHQ9 QUESTIONS 1 & 2: 0

## 2024-04-16 ASSESSMENT — MINI COG
TOTAL SCORE: 5
COMPLETED: YES

## 2024-04-16 NOTE — PROGRESS NOTES
Subjective     Den Ramirez is a 89 y.o. male who presents for a subsequent annual wellness visit.     Patient Care Team:  Vera Whitten DO as PCP - General  Ilan Steinberg MD as Referring Physician (Nephrology)  Freddy Luna MD as Referring Physician (Interventional Cardiology)    Comprehensive Medical and Social History  Patient Active Problem List   Diagnosis    Benign essential HTN    CKD (chronic kidney disease), stage III (CMS/HCC)    Dyslipidemia    High cholesterol    Hyperlipidemia LDL goal <100    Hypertension    Glucose intolerance (impaired glucose tolerance)    Hyperparathyroidism, secondary renal (CMS/HCC)    Chronic renal disease, stage IV (CMS/HCC)     Past Medical History:   Diagnosis Date    Arthritis     mild    Spinal stenosis, lumbar 2017     History reviewed. No pertinent surgical history.  Allergies   Allergen Reactions    No Known Allergies      Current Outpatient Medications   Medication Sig Dispense Refill    amLODIPine (NORVASC) 5 mg tablet TAKE 1 TABLET (5 MG TOTAL) BY MOUTH DAILY. 90 tablet 1    aspirin 81 mg enteric coated tablet 81 mg.      cetirizine (ZyrTEC) 10 mg tablet 10 mg.      hydrochlorothiazide (HYDRODIURIL) 25 mg tablet Take 1 tablet (25 mg total) by mouth once daily. 90 tablet 1    multivitamin with iron tablet Take by mouth daily.       No current facility-administered medications for this visit.     Social History     Tobacco Use    Smoking status: Some Days     Types: Cigars     Last attempt to quit: 2000     Years since quittin.3    Smokeless tobacco: Never    Tobacco comments:     no longer cigars   Substance Use Topics    Alcohol use: Not Currently     Alcohol/week: 3.0 - 4.0 standard drinks of alcohol     Types: 3 - 4 Standard drinks or equivalent per week    Drug use: No     Family History   Problem Relation Age of Onset    Heart attack Biological Father        Objective   Vitals  Vitals:    24 1022   BP: 134/82   BP Location: Right upper arm  "  Patient Position: Sitting   Pulse: 76   Resp: 16   Temp: 36.6 °C (97.9 °F)   TempSrc: Temporal   SpO2: 98%   Weight: 86.7 kg (191 lb 3.2 oz)   Height: 1.727 m (5' 7.99\")     Body mass index is 29.08 kg/m².    Advanced Care Plan                                        PHQ  Will the patient answer the depression questions?: Yes   Little interest or pleasure in doing things: Not at all   Feeling down, depressed, or hopeless: Not at all   Depression Risk: 0                                             Mini Cog  Completed: Yes  Score: 5  Result: Negative      Get Up and Go  Result: Pass    STEADI Falls Risk  One or more falls in the last year: No           Has trouble stepping up onto a curb: No   Advised to use a cane or walker to get around safely: No   Often has to rush to the toilet: Yes   Feels unsteady when walking: No   Has lost some feeling in feet: No   Often feels sad or depressed: No   Steadies self on furniture while walking at home: No   Takes medication that makes him/her feel lightheaded or more tired than usual: No   Worried about falling: No   Takes medicine to sleep or improve mood: No   Needs to push with hands when rising from a chair: Yes   Falls screen completed: Yes     Hearing and Vision Screening  No results found.  See HRA for relevant hearing screening response.      Other Providers caring for patient: see list     Diet:  healthy  Activity: regularly but less with back pain  New Concerns: feet cold and urinating 5 x a night x several months.     Review Of Systems   All other systems reviewed and negative except as noted in the HPI.     Visit Vitals  /82 (BP Location: Right upper arm, Patient Position: Sitting)   Pulse 76   Temp 36.6 °C (97.9 °F) (Temporal)   Resp 16   Ht 1.727 m (5' 7.99\")   Wt 86.7 kg (191 lb 3.2 oz)   SpO2 98%   BMI 29.08 kg/m²      Gait:  Normal  Alert and well appearing   Awake and oriented with normal affect and appropriate behavior   HEENT: Normocephalic and " atraumatic, pupils equal, round, OP clear with moist mucous membranes  Neck:  supple, no thyroid enlargement, no LAD  Lungs: Normal breath sounds, lungs CTA with no RRW  Heart:Regular rate rhythm with no murmurs, gallops or rubs   Ext: No clubbing, cyanosis or edema       no lab studies available for review at time of visit.    Education, counseling and referral for any pertinent health issues identified -       Preventive Services:   Adult DTaP: not covered   Pneumovax: Up to date    Flu Vaccine advised: Fall/Now/UTD: /na    Zostervax/Shingrix: Up to date        Health Maintenance up to date:  Dexascan: no  Colonoscopy: no  Mammogram: no    Anticipatory Guidance   Safe Sex/STD's yes  Smoking Cessation yes  Sunscreen/ABCDs yes  Diet/Exercise yes  Recommend routing opth & dental care  yes     Diet and Exercise              Assessment/Plan   Diagnoses and all orders for this visit:    Medicare annual wellness visit, subsequent (Primary)    Chronic renal disease, stage IV (CMS/HCC)    Hyperparathyroidism, secondary renal (CMS/HCC)    Nocturia  -     Urinalysis with Reflex Culture; Future  -     PSA; Future  -     Ambulatory referral to Urology; Future    Continue current medications, recheck 6 months.  Treatment goals reviewed.  Discussed all medications with patient.  Patient verbalized understanding.     See Patient Instructions (the written plan) which was given to the patient for PPPS and health risk factors with interventions.

## 2024-04-16 NOTE — PATIENT INSTRUCTIONS
Your Personalized Prevention Plan Services (PPPS)    Preventive Services Checklist (Assumes Average Risk Unless Otherwise Noted):    Health Maintenance Topics with due status: Overdue       Topic Date Due    Depression Screening Never done    RSV (60+ years old [shared decision making] or in pregnancy during 32 through 36 weeks) Never done    Zoster Vaccine 04/05/2012    DTaP, Tdap, and Td Vaccines 11/01/2015    Falls Risk Screening 03/23/2023    COVID-19 Vaccine Never done    Medicare Annual Wellness Visit 03/20/2024     Health Maintenance Topics with due status: Completed       Topic Last Completion Date    Pneumococcal (65 years and older) 01/09/2019    Influenza Vaccine 10/16/2023     Health Maintenance Topics with due status: Aged Out       Topic Date Due    Meningococcal ACWY Aged Out    RSV <20 months Aged Out    HIB Vaccines Aged Out    Hepatitis B Vaccines Aged Out    IPV Vaccines Aged Out    HPV Vaccines Aged Out       You May Be Eligible for These Additional Preventive Services   (Assumes Average Risk Unless Otherwise Noted)  Diabetes Screening Any 1 risk factor: hypertension, dyslipidemia, obesity, high glucose; or Any 2 risk factors: >=66yo, overweight, family history diabetes (covered every 6 months)   Hepatitis C Screening Any 1 risk factor: 1) blood transfusion before 1992,   2) current or past injection drug use (annually for high risk; if born between 7415-0707, see above for status).   Vaccine: Hepatitis B As necessary if at-risk: hemophilia, ESRD, diabetes, living with individual infected with hep B, healthcare worker with frequent contact with blood/bodily fluids (series covered once)   Sexually Transmitted Diseases (STDs) As necessary chlamydia, gonorrhea, syphilis, hepatitis B (covered annually)  HIV if any 1 risk factor present: 1) <16yo or >66yo and at increased risk or 2) 15-66yo and ask for it (covered annually)   Lung Cancer Screening Low dose chest CT if all three  risk factors: 1) 50-78yo, 2) smoker or quit within last 15y, 3) >=20 pack years (covered annually).  No results found for this or any previous visit.       Cholesterol Screening No signs or symptoms of cardiovascular disease (screening covered every 5 years).   Prostate Cancer Screening >= 49yo if patient desires test after weighting potential harms and benefits (covered annually)       Health Risk Factors with Personalized Education:  ----------------------------------------------------------------------------------------------------------------------  Controlling Your Blood Pressure  Maintain a normal weight (body mass index between 18.5 and 24.9).  Eat more fruit, vegetables and low-fat dairy.  Eat less saturated fat and total fat.  Lower your sodium (salt) intake.  Try to stay under 1500 mg per day, but if you cannot get your intake to be that low, at least lower it by 1000 mg.  Stay active.  Try to get at least 90 to 150 minutes of exercise per week.  Try brisk walking, swimming, bicycling or dancing.  Limit alcohol intake.  When you do consume alcohol, drink no more than 2 drinks per day.  If you have been prescribed medication, take it regularly and exactly as prescribed.  Let your PCP know if you have any problems or questions about your medication.  Check your blood pressure at home or at the store.  Write down your readings and share them with your PCP  ----------------------------------------------------------------------------------------------------------------------  Controlling Your Glucose (Sugar) Levels  Stress can raise your blood sugar.  Learn what causes your stress.  Learn to lower your stress by spending time with family and friends, exercising, deep breathing, trying meditation or yoga, enjoying hobbies and getting enough rest.  Let your PCP know if you’re having problems limiting your stress.  Maintain a healthy weight by balancing your diet and exercise.  Choose foods that are lower in  calories, saturated fat, trans fat, sugar and salt.  Eat foods with more fiber, like whole grain cereals, bread, crackers, rice or pasta.  Choose to eat fruit, vegetables, and low-fat or fat-free/skim dairy.  Reduce the portion size of your meals.  Make half of your meal vegetables and fruit, and divide the other half between lean protein and whole grains.  Drink water instead of juice and regular soda.  Spend at least some time being active on most days of the week.  Work to increase your muscle strength at least twice per week.  Try things like light weights, stretch bands, yoga, heavy gardening (digging, planting with tools) or push-ups  ----------------------------------------------------------------------------------------------------------------------  Controlling Your Cholesterol  Reduce the amount of saturated and trans fat in your diet.  Limit intake of red meat.  Consume only low-fat or non-fat/skim dairy.  Limit fried food.  Cook with vegetable oils.  Reduce your intake of sugary foods, sugary drinks and alcohol.  Eat a diet high in fruit, vegetables and whole grains.  Get protein from fish, poultry and a small portion of nuts.  Stay active.  Try to get at least 90 to 150 minutes of exercise per week.  Try brisk walking, swimming, bicycling or dancing.  Maintain a healthy weight by balancing your diet and exercise.  If you have been prescribed medication, take it regularly and exactly as prescribed. Let your PCP know if you have any problems or questions about your medication.  It’s important to know your cholesterol numbers.  When recommended by your PCP, get the cholesterol blood test.  ----------------------------------------------------------------------------------------------------------------------  Reducing Your Risk of Falls  Tell your PCP if any of your medications make you feel tired, dizzy, lightheaded or off-balance.  Maintain coordination, flexibility and balance by ensuring regular physical  activity.  Limit alcohol intake to 1 drink per day.  Consider avoiding all alcohol intake.  Ensure good vision.  Visit an ophthalmologist or optometrist regularly for vision screening or to make sure your glasses / contact lens prescription is correct.  If you need glasses or contacts, wear them.  When you get new glasses or contacts, take time to get used to them.  Do not wear sunglasses or tinted lenses when indoors.  Ensure good hearing.  Have your hearing checked if you are having trouble hearing, or family and friends think you cannot hear them.  If you need a hearing aid, be sure it fits well and wear it.  Get enough rest.  Ensure about 7-9 hours of sleep every day.  Get up slowly from your bed or chairs.  Do not start walking until you are sure you feel steady.  Wear non-skid, rubber-soled, low-heeled shoes.  Do not walk in socks, or in shoes and slippers with smooth soles.  If your PCP or therapist recommends using a cane or walker, use it regularly.  Make your home safer.  Increase lighting throughout the house, especially at the top and bottom of stairs.  Ensure lighting is easily turned on when getting up in the middle of the night.  Make sure there are two secure rails on all stairs.  Install grab bars in the bathtub / shower and near the toilet.  Consider using a shower chair and / or a hand-held shower.  Spread sand or salt on icy surfaces.  Beware of wet surfaces, which can be icy.  Tell your PCP if you have fallen.

## 2024-10-01 DIAGNOSIS — I10 BENIGN ESSENTIAL HTN: ICD-10-CM

## 2024-10-01 RX ORDER — AMLODIPINE BESYLATE 5 MG/1
5 TABLET ORAL DAILY
Qty: 90 TABLET | Refills: 1 | Status: SHIPPED | OUTPATIENT
Start: 2024-10-01 | End: 2025-03-21

## 2024-10-01 NOTE — TELEPHONE ENCOUNTER
Medicine Refill Request    Last Office Visit: 4/16/2024   Last Consult Visit: Visit date not found  Last Telemedicine Visit: Visit date not found    Next Appointment: 10/17/2024      Current Outpatient Medications:     amLODIPine (NORVASC) 5 mg tablet, TAKE 1 TABLET (5 MG TOTAL) BY MOUTH DAILY., Disp: 90 tablet, Rfl: 1    aspirin 81 mg enteric coated tablet, 81 mg., Disp: , Rfl:     cetirizine (ZyrTEC) 10 mg tablet, 10 mg., Disp: , Rfl:     hydrochlorothiazide (HYDRODIURIL) 25 mg tablet, Take 1 tablet (25 mg total) by mouth once daily., Disp: 90 tablet, Rfl: 1    multivitamin with iron tablet, Take by mouth daily., Disp: , Rfl:       BP Readings from Last 3 Encounters:   04/16/24 134/82   10/16/23 140/82   03/20/23 (!) 154/90       Recent Lab results:  Lab Results   Component Value Date    CHOL 184 10/11/2023   ,   Lab Results   Component Value Date    HDL 81 10/11/2023   ,   Lab Results   Component Value Date    LDLCALC 92 10/11/2023   ,   Lab Results   Component Value Date    TRIG 58 10/11/2023        Lab Results   Component Value Date    GLUCOSE 91 10/11/2023   ,   Lab Results   Component Value Date    HGBA1C 5.5 10/11/2023         Lab Results   Component Value Date    CREATININE 2.07 (H) 10/11/2023       Lab Results   Component Value Date    TSH 3.420 03/09/2023           Lab Results   Component Value Date    HGBA1C 5.5 10/11/2023

## 2024-10-11 LAB
APPEARANCE UR: CLEAR
BACTERIA #/AREA URNS HPF: NORMAL /[HPF]
BILIRUB UR QL STRIP: NEGATIVE
CASTS URNS QL MICRO: NORMAL /LPF
COLOR UR: YELLOW
EPI CELLS #/AREA URNS HPF: NORMAL /HPF (ref 0–10)
GLUCOSE UR QL STRIP: NEGATIVE
HGB UR QL STRIP: NEGATIVE
KETONES UR QL STRIP: NEGATIVE
LAB CORP URINALYSIS REFLEX: NORMAL
LEUKOCYTE ESTERASE UR QL STRIP: NEGATIVE
MICRO URNS: NORMAL
MICRO URNS: NORMAL
NITRITE UR QL STRIP: NEGATIVE
PH UR STRIP: 6 [PH] (ref 5–7.5)
PROT UR QL STRIP: NORMAL
PSA SERPL-MCNC: 2.3 NG/ML (ref 0–4)
RBC #/AREA URNS HPF: NORMAL /HPF (ref 0–2)
SP GR UR STRIP: 1.02 (ref 1–1.03)
UROBILINOGEN UR STRIP-MCNC: 0.2 MG/DL (ref 0.2–1)
WBC #/AREA URNS HPF: NORMAL /HPF (ref 0–5)

## 2024-10-17 ENCOUNTER — OFFICE VISIT (OUTPATIENT)
Dept: FAMILY MEDICINE | Facility: CLINIC | Age: 89
End: 2024-10-17
Payer: MEDICARE

## 2024-10-17 VITALS
RESPIRATION RATE: 16 BRPM | OXYGEN SATURATION: 99 % | HEIGHT: 68 IN | BODY MASS INDEX: 29.43 KG/M2 | WEIGHT: 194.2 LBS | TEMPERATURE: 97.5 F | SYSTOLIC BLOOD PRESSURE: 132 MMHG | DIASTOLIC BLOOD PRESSURE: 78 MMHG | HEART RATE: 82 BPM

## 2024-10-17 DIAGNOSIS — R73.02 GLUCOSE INTOLERANCE (IMPAIRED GLUCOSE TOLERANCE): ICD-10-CM

## 2024-10-17 DIAGNOSIS — N18.4 CHRONIC RENAL DISEASE, STAGE IV (CMS/HCC): ICD-10-CM

## 2024-10-17 DIAGNOSIS — E78.5 HYPERLIPIDEMIA LDL GOAL <100: ICD-10-CM

## 2024-10-17 DIAGNOSIS — I10 BENIGN ESSENTIAL HTN: Primary | ICD-10-CM

## 2024-10-17 PROCEDURE — G0008 ADMIN INFLUENZA VIRUS VAC: HCPCS | Performed by: FAMILY MEDICINE

## 2024-10-17 PROCEDURE — 99214 OFFICE O/P EST MOD 30 MIN: CPT | Mod: 25 | Performed by: FAMILY MEDICINE

## 2024-10-17 PROCEDURE — 90662 IIV NO PRSV INCREASED AG IM: CPT | Performed by: FAMILY MEDICINE

## 2024-10-17 RX ORDER — TAMSULOSIN HYDROCHLORIDE 0.4 MG/1
0.4 CAPSULE ORAL
COMMUNITY
Start: 2024-05-06

## 2024-10-17 NOTE — PROGRESS NOTES
"    New concerns: daughter passed away last month.   Hypertension ROS: taking medications as instructed, no medication side effects noted, no chest pain on exertion, no dyspnea on exertion, no swelling of ankles.     Diet:  healthy  Weight: The patient reports no significant weight change.  Exercise:  back to gym  Review of Systems - All other systems reviewed and negative except as noted in the HPI.       Visit Vitals  /78 (BP Location: Right upper arm, Patient Position: Sitting)   Pulse 82   Temp 36.4 °C (97.5 °F) (Temporal)   Resp 16   Ht 1.727 m (5' 7.99\")   Wt 88.1 kg (194 lb 3.2 oz)   SpO2 99%   BMI 29.53 kg/m²      Gait:  Normal  Alert and well appearing   Awake and oriented with normal affect and appropriate behavior   HEENT: Normocephalic and atraumatic, pupils equal, round, OP clear with moist mucous membranes  Neck:  supple, no thyroid enlargement, no LAD  Lungs: Normal breath sounds, lungs CTA with no RRW  Heart:Regular rate rhythm with no murmurs, gallops or rubs   Ext: No clubbing, cyanosis or edema     labs reviewed, I note that PSA  normal.        Diagnosis Plan   1. Benign essential HTN  CBC and differential    TSH w reflex FT4    CBC and differential    TSH w reflex FT4    Ambulatory referral to Cardiology      2. Chronic renal disease, stage IV (CMS/HCC)        3. Hyperlipidemia LDL goal <100  Lipid panel    Lipid panel    Ambulatory referral to Cardiology      4. Glucose intolerance (impaired glucose tolerance)  Hemoglobin A1c    Comprehensive metabolic panel    Hemoglobin A1c    Comprehensive metabolic panel        If labs stable, Continue current medications, recheck 6 months.  Treatment goals reviewed.  Discussed all medications with patient.  Patient verbalized understanding.   "

## 2025-03-21 DIAGNOSIS — I10 BENIGN ESSENTIAL HTN: ICD-10-CM

## 2025-03-21 RX ORDER — AMLODIPINE BESYLATE 5 MG/1
5 TABLET ORAL DAILY
Qty: 90 TABLET | Refills: 1 | Status: SHIPPED | OUTPATIENT
Start: 2025-03-21 | End: 2025-04-18

## 2025-03-21 NOTE — TELEPHONE ENCOUNTER
Medicine Refill Request    Last Office Visit: 10/17/2024   Last Consult Visit: Visit date not found  Last Telemedicine Visit: Visit date not found    Next Appointment: 4/18/2025      Current Outpatient Medications:     amLODIPine (NORVASC) 5 mg tablet, TAKE 1 TABLET (5 MG TOTAL) BY MOUTH DAILY., Disp: 90 tablet, Rfl: 1    aspirin 81 mg enteric coated tablet, 81 mg., Disp: , Rfl:     cetirizine (ZyrTEC) 10 mg tablet, 10 mg., Disp: , Rfl:     hydrochlorothiazide (HYDRODIURIL) 25 mg tablet, Take 1 tablet (25 mg total) by mouth once daily., Disp: 90 tablet, Rfl: 1    multivitamin with iron tablet, Take by mouth daily., Disp: , Rfl:     tamsulosin (FLOMAX) 0.4 mg capsule, Take 0.4 mg by mouth., Disp: , Rfl:     BP Readings from Last 3 Encounters:   10/17/24 132/78   04/16/24 134/82   10/16/23 140/82       Recent Lab results:  Lab Results   Component Value Date    CHOL 184 10/11/2023   ,   Lab Results   Component Value Date    HDL 81 10/11/2023   ,   Lab Results   Component Value Date    LDLCALC 92 10/11/2023   ,   Lab Results   Component Value Date    TRIG 58 10/11/2023        Lab Results   Component Value Date    GLUCOSE 91 10/11/2023   ,   Lab Results   Component Value Date    HGBA1C 5.5 10/11/2023         Lab Results   Component Value Date    CREATININE 2.07 (H) 10/11/2023       Lab Results   Component Value Date    TSH 3.420 03/09/2023           Lab Results   Component Value Date    HGBA1C 5.5 10/11/2023

## 2025-03-26 ENCOUNTER — TELEPHONE (OUTPATIENT)
Dept: FAMILY MEDICINE | Facility: CLINIC | Age: 89
End: 2025-03-26
Payer: MEDICARE

## 2025-03-26 NOTE — TELEPHONE ENCOUNTER
"Patient went to [facility] for an appointment last Friday. His BP was \"160 over something\". The cardiologist mentioned increasing amlodipine by 2.5mg from 5mg. He has been taking his BP since then and ranges from 130-132/78/80. He took BP today and it was \"160 over something\" again. He has no symptoms. Patient wanted to inform Dr. Whitten and see if she wanted to increase medication. Please advise.  "

## 2025-03-27 NOTE — TELEPHONE ENCOUNTER
Spoke with patient and he reports BP this morning was 135/62, took his 5 mg Amlodipine and at approx 10:30 retook BP, 122/70. States new cardiologist was in a rush and never rechecked BP and ordered Amlodipine 5 mg thinking it was an increase. Patient has been on 5 mg for some time. Advised patient to monitor BP daily at the same time and follow u on Wednesday of next week. Patient states he has made 3 calls to cardiology with no response.

## 2025-04-01 LAB
ALBUMIN SERPL-MCNC: 4.2 G/DL (ref 3.7–4.7)
ALP SERPL-CCNC: 71 IU/L (ref 44–121)
ALT SERPL-CCNC: 9 IU/L (ref 0–44)
AST SERPL-CCNC: 17 IU/L (ref 0–40)
BASOPHILS # BLD AUTO: 0 X10E3/UL (ref 0–0.2)
BASOPHILS NFR BLD AUTO: 1 %
BILIRUB SERPL-MCNC: 0.5 MG/DL (ref 0–1.2)
BUN SERPL-MCNC: 41 MG/DL (ref 8–27)
BUN/CREAT SERPL: 21 (ref 10–24)
CALCIUM SERPL-MCNC: 7.8 MG/DL (ref 8.6–10.2)
CHLORIDE SERPL-SCNC: 103 MMOL/L (ref 96–106)
CHOLEST SERPL-MCNC: 199 MG/DL (ref 100–199)
CO2 SERPL-SCNC: 21 MMOL/L (ref 20–29)
CREAT SERPL-MCNC: 1.97 MG/DL (ref 0.76–1.27)
EGFRCR SERPLBLD CKD-EPI 2021: 32 ML/MIN/1.73
EOSINOPHIL # BLD AUTO: 0.3 X10E3/UL (ref 0–0.4)
EOSINOPHIL NFR BLD AUTO: 5 %
ERYTHROCYTE [DISTWIDTH] IN BLOOD BY AUTOMATED COUNT: 12.9 % (ref 11.6–15.4)
GLOBULIN SER CALC-MCNC: 2.1 G/DL (ref 1.5–4.5)
GLUCOSE SERPL-MCNC: 94 MG/DL (ref 70–99)
HBA1C MFR BLD: 5.6 % (ref 4.8–5.6)
HCT VFR BLD AUTO: 39.4 % (ref 37.5–51)
HDLC SERPL-MCNC: 85 MG/DL
HGB BLD-MCNC: 13.4 G/DL (ref 13–17.7)
IMM GRANULOCYTES # BLD AUTO: 0.1 X10E3/UL (ref 0–0.1)
IMM GRANULOCYTES NFR BLD AUTO: 1 %
LDLC SERPL CALC-MCNC: 101 MG/DL (ref 0–99)
LYMPHOCYTES # BLD AUTO: 1.4 X10E3/UL (ref 0.7–3.1)
LYMPHOCYTES NFR BLD AUTO: 21 %
MCH RBC QN AUTO: 30.4 PG (ref 26.6–33)
MCHC RBC AUTO-ENTMCNC: 34 G/DL (ref 31.5–35.7)
MCV RBC AUTO: 89 FL (ref 79–97)
MONOCYTES # BLD AUTO: 0.5 X10E3/UL (ref 0.1–0.9)
MONOCYTES NFR BLD AUTO: 8 %
NEUTROPHILS # BLD AUTO: 4.3 X10E3/UL (ref 1.4–7)
NEUTROPHILS NFR BLD AUTO: 64 %
PLATELET # BLD AUTO: 243 X10E3/UL (ref 150–450)
POTASSIUM SERPL-SCNC: 4.9 MMOL/L (ref 3.5–5.2)
PROT SERPL-MCNC: 6.3 G/DL (ref 6–8.5)
RBC # BLD AUTO: 4.41 X10E6/UL (ref 4.14–5.8)
SODIUM SERPL-SCNC: 139 MMOL/L (ref 134–144)
T4 FREE SERPL-MCNC: 1.13 NG/DL (ref 0.82–1.77)
TRIGL SERPL-MCNC: 75 MG/DL (ref 0–149)
TSH SERPL DL<=0.005 MIU/L-ACNC: 3.36 UIU/ML (ref 0.45–4.5)
VLDLC SERPL CALC-MCNC: 13 MG/DL (ref 5–40)
WBC # BLD AUTO: 6.7 X10E3/UL (ref 3.4–10.8)

## 2025-04-18 ENCOUNTER — OFFICE VISIT (OUTPATIENT)
Dept: FAMILY MEDICINE | Facility: CLINIC | Age: 89
End: 2025-04-18
Payer: MEDICARE

## 2025-04-18 VITALS
HEIGHT: 69 IN | RESPIRATION RATE: 15 BRPM | HEART RATE: 89 BPM | DIASTOLIC BLOOD PRESSURE: 78 MMHG | TEMPERATURE: 97.4 F | WEIGHT: 194 LBS | SYSTOLIC BLOOD PRESSURE: 162 MMHG | OXYGEN SATURATION: 98 % | BODY MASS INDEX: 28.73 KG/M2

## 2025-04-18 DIAGNOSIS — Z00.00 MEDICARE ANNUAL WELLNESS VISIT, SUBSEQUENT: Primary | ICD-10-CM

## 2025-04-18 DIAGNOSIS — E78.5 HYPERLIPIDEMIA LDL GOAL <100: ICD-10-CM

## 2025-04-18 DIAGNOSIS — N25.81 HYPERPARATHYROIDISM, SECONDARY RENAL (CMS/HCC): ICD-10-CM

## 2025-04-18 DIAGNOSIS — R73.02 GLUCOSE INTOLERANCE (IMPAIRED GLUCOSE TOLERANCE): ICD-10-CM

## 2025-04-18 DIAGNOSIS — N18.4 CHRONIC RENAL DISEASE, STAGE IV (CMS/HCC): ICD-10-CM

## 2025-04-18 DIAGNOSIS — I10 BENIGN ESSENTIAL HTN: ICD-10-CM

## 2025-04-18 PROCEDURE — 1123F ACP DISCUSS/DSCN MKR DOCD: CPT | Performed by: FAMILY MEDICINE

## 2025-04-18 PROCEDURE — G0439 PPPS, SUBSEQ VISIT: HCPCS | Performed by: FAMILY MEDICINE

## 2025-04-18 RX ORDER — AMLODIPINE BESYLATE 5 MG/1
7.5 TABLET ORAL DAILY
Qty: 135 TABLET | Refills: 1 | Status: SHIPPED | OUTPATIENT
Start: 2025-04-18 | End: 2025-10-15

## 2025-04-18 ASSESSMENT — MINI COG
TOTAL SCORE: 4
COMPLETED: YES

## 2025-04-18 ASSESSMENT — PATIENT HEALTH QUESTIONNAIRE - PHQ9: SUM OF ALL RESPONSES TO PHQ9 QUESTIONS 1 & 2: 0

## 2025-04-18 NOTE — PROGRESS NOTES
Subjective     Den Ramirez is a 89 y.o. male who presents for a subsequent annual wellness visit.     Patient Care Team:  Vera Whitten DO as PCP - General  Ilan Steinberg MD as Referring Physician (Nephrology)  Freddy Luna MD as Referring Physician (Interventional Cardiology)    Comprehensive Medical and Social History  Patient Active Problem List   Diagnosis    Benign essential HTN    CKD (chronic kidney disease), stage III (CMS/HCC)    Dyslipidemia    High cholesterol    Hyperlipidemia LDL goal <100    Hypertension    Glucose intolerance (impaired glucose tolerance)    Hyperparathyroidism, secondary renal (CMS/HCC)    Chronic renal disease, stage IV (CMS/HCC)     Past Medical History:   Diagnosis Date    Arthritis     mild    Spinal stenosis, lumbar 2017     No past surgical history on file.  Allergies   Allergen Reactions    No Known Allergies      Current Outpatient Medications   Medication Sig Dispense Refill    amLODIPine (NORVASC) 5 mg tablet Take 1.5 tablets (7.5 mg total) by mouth daily. 135 tablet 1    cetirizine (ZyrTEC) 10 mg tablet 10 mg.      hydrochlorothiazide (HYDRODIURIL) 25 mg tablet Take 1 tablet (25 mg total) by mouth once daily. 90 tablet 1    multivitamin with iron tablet Take by mouth daily.      tamsulosin (FLOMAX) 0.4 mg capsule Take 0.4 mg by mouth.       No current facility-administered medications for this visit.     Social History     Tobacco Use    Smoking status: Some Days     Types: Cigars     Last attempt to quit: 2000     Years since quittin.3    Smokeless tobacco: Never    Tobacco comments:     no longer cigars   Substance Use Topics    Alcohol use: Not Currently     Alcohol/week: 3.0 - 4.0 standard drinks of alcohol     Types: 3 - 4 Standard drinks or equivalent per week    Drug use: No     Family History   Problem Relation Name Age of Onset    Heart attack Biological Father      Heart disease Biological Daughter      Obesity Biological Daughter         Objective  "  Vitals  Vitals:    04/18/25 1241 04/18/25 1307   BP: (!) 164/70 (!) 162/78   BP Location: Left upper arm Left upper arm   Patient Position: Sitting Sitting   Pulse: 89    Resp: 15    Temp: 36.3 °C (97.4 °F)    TempSrc: Temporal    SpO2: 98%    Weight: 88 kg (194 lb)    Height: 1.753 m (5' 9\")      Body mass index is 28.65 kg/m².    Advanced Care Plan  Does patient have advance directive?: Yes       Patient has Advance Directive: Advance Directive is NOT in chart, requested to bring in                             PHQ  Will the patient answer the depression questions?: Yes   Little interest or pleasure in doing things: Not at all   Feeling down, depressed, or hopeless: Not at all   Depression Risk: 0                                             Mini Cog  Completed: Yes  Score: 4  Result: Negative      Get Up and Go  Result: Pass    STEADI Falls Risk                                                                Hearing and Vision Screening  No results found.  See HRA for relevant hearing screening response.    Current Outpatient Medications on File Prior to Visit   Medication Sig    cetirizine (ZyrTEC) 10 mg tablet 10 mg.    hydrochlorothiazide (HYDRODIURIL) 25 mg tablet Take 1 tablet (25 mg total) by mouth once daily.    multivitamin with iron tablet Take by mouth daily.    tamsulosin (FLOMAX) 0.4 mg capsule Take 0.4 mg by mouth.     No current facility-administered medications on file prior to visit.     Other Providers caring for patient: see list     Diet:  working on salt  Activity: trying  New Concerns: worried about blood pressure, cardio was supposed to increase amlodipine but kept him at the same dose.   Review Of Systems   All other systems reviewed and negative except as noted in the HPI.     Advance care plan discussed and documented in the medical record      Visit Vitals  BP (!) 162/78 (BP Location: Left upper arm, Patient Position: Sitting)   Pulse 89   Temp 36.3 °C (97.4 °F) (Temporal)   Resp 15   Ht " "1.753 m (5' 9\")   Wt 88 kg (194 lb)   SpO2 98%   BMI 28.65 kg/m²      Gait:  Normal  Alert and well appearing   Awake and oriented with normal affect and appropriate behavior   HEENT: Normocephalic and atraumatic, pupils equal, round, OP clear with moist mucous membranes  Neck:  supple, no thyroid enlargement, no LAD  Lungs: Normal breath sounds, lungs CTA with no RRW  Heart: RRR, small murmur    Ext: No clubbing, cyanosis or edema       labs reviewed, I note that glycosylated hemoglobin normal, lipids  LDL result does not yet meet goal, hemogram  normal, comprehensive metabolic panel  abnormal kidney - stable, low calcium , TSH  normal.    Education, counseling and referral for any pertinent health issues identified -       Preventive Services:   Adult DTaP: not covered   Pneumovax: Up to date    Flu Vaccine advised: Fall/Now/UTD: Not applicable.     Covid:  will get at pharmacy  Zostervax/Shingrix: can get at pharmacy       Health Maintenance orders needed:  Dexascan: no  Colonoscopy: no  Mammogram: no    Anticipatory Guidance   Safe Sex/STD's yes  Smoking Cessation yes  Sunscreen/ABCDs yes  Diet/Exercise yes  Recommend routing opth & dental care  yes     Diet and Exercise              Assessment/Plan   Diagnoses and all orders for this visit:    Medicare annual wellness visit, subsequent (Primary)    Chronic renal disease, stage IV (CMS/HCC)  -     Ambulatory referral to [facility]    Benign essential HTN  -     Ambulatory referral to [facility]  -     amLODIPine (NORVASC) 5 mg tablet; Take 1.5 tablets (7.5 mg total) by mouth daily.    Glucose intolerance (impaired glucose tolerance)  -     Comprehensive metabolic panel; Future  -     Hemoglobin A1c; Future    Hyperlipidemia LDL goal <100  -     Lipid panel; Future    Hyperparathyroidism, secondary renal (CMS/HCC)  -     Ambulatory referral to [facility]    Increase amlodipine to 7.5 mg.  Cardiology did not increase like they thought they " had.  Seeing nephro next month.    Low calcium - take 5000 IU of D3 daily.    Continue current medications, recheck 6 months.  Treatment goals reviewed.  Discussed all medications with patient.  Patient verbalized understanding.     See Patient Instructions (the written plan) which was given to the patient for PPPS and health risk factors with interventions.

## 2025-04-18 NOTE — PATIENT INSTRUCTIONS
Your Personalized Prevention Plan Services (PPPS)    Preventive Services Checklist (Assumes Average Risk Unless Otherwise Noted):    Health Maintenance Topics with due status: Overdue       Topic Date Due    Advance Care Planning Never done    Depression Screening Never done    RSV Vaccine Never done    Zoster Vaccine 04/05/2012    DTaP, Tdap, and Td Vaccines 11/01/2015    Falls Risk Screening 03/23/2023    COVID-19 Vaccine 03/24/2025    Medicare Annual Wellness Visit 04/16/2025     Health Maintenance Topics with due status: Completed       Topic Last Completion Date    Pneumococcal (50 years of age and older) 01/09/2019    Influenza Vaccine 10/17/2024     Health Maintenance Topics with due status: Aged Out       Topic Date Due    Meningococcal ACWY Aged Out    RSV <20 months Aged Out    HIB Vaccines Aged Out    Hepatitis B Vaccines Aged Out    IPV Vaccines Aged Out    Meningococcal B Aged Out    HPV Vaccines Aged Out       You May Be Eligible for These Additional Preventive Services   (Assumes Average Risk Unless Otherwise Noted)  Diabetes Screening Any 1 risk factor: hypertension, dyslipidemia, obesity, high glucose; or Any 2 risk factors: >=64yo, overweight, family history diabetes (covered every 6 months)   Hepatitis C Screening Any 1 risk factor: 1) blood transfusion before 1992,   2) current or past injection drug use (annually for high risk; if born between 7997-4360, see above for status).   Vaccine: Hepatitis B As necessary if at-risk: hemophilia, ESRD, diabetes, living with individual infected with hep B, healthcare worker with frequent contact with blood/bodily fluids (series covered once)   Sexually Transmitted Diseases (STDs) As necessary chlamydia, gonorrhea, syphilis, hepatitis B (covered annually)  HIV if any 1 risk factor present: 1) <14yo or >64yo and at increased risk or 2) 15-64yo and ask for it (covered annually)   Lung Cancer Screening Low dose chest CT if all three risk  factors: 1) 50-76yo, 2) smoker or quit within last 15y, 3) >=20 pack years (covered annually).  No results found for this or any previous visit.       Cholesterol Screening No signs or symptoms of cardiovascular disease (screening covered every 5 years).   Prostate Cancer Screening >= 51yo if patient desires test after weighting potential harms and benefits (covered annually)       Health Risk Factors with Personalized Education:  ----------------------------------------------------------------------------------------------------------------------  Controlling Your Blood Pressure  Maintain a normal weight (body mass index between 18.5 and 24.9).  Eat more fruit, vegetables and low-fat dairy.  Eat less saturated fat and total fat.  Lower your sodium (salt) intake.  Try to stay under 1500 mg per day, but if you cannot get your intake to be that low, at least lower it by 1000 mg.  Stay active.  Try to get at least 90 to 150 minutes of exercise per week.  Try brisk walking, swimming, bicycling or dancing.  Limit alcohol intake.  When you do consume alcohol, drink no more than 2 drinks per day.  If you have been prescribed medication, take it regularly and exactly as prescribed.  Let your PCP know if you have any problems or questions about your medication.  Check your blood pressure at home or at the store.  Write down your readings and share them with your PCP  ----------------------------------------------------------------------------------------------------------------------  Controlling Your Glucose (Sugar) Levels  Stress can raise your blood sugar.  Learn what causes your stress.  Learn to lower your stress by spending time with family and friends, exercising, deep breathing, trying meditation or yoga, enjoying hobbies and getting enough rest.  Let your PCP know if youre having problems limiting your stress.  Maintain a healthy weight by balancing your diet and exercise.  Choose foods that are lower in calories,  saturated fat, trans fat, sugar and salt.  Eat foods with more fiber, like whole grain cereals, bread, crackers, rice or pasta.  Choose to eat fruit, vegetables, and low-fat or fat-free/skim dairy.  Reduce the portion size of your meals.  Make half of your meal vegetables and fruit, and divide the other half between lean protein and whole grains.  Drink water instead of juice and regular soda.  Spend at least some time being active on most days of the week.  Work to increase your muscle strength at least twice per week.  Try things like light weights, stretch bands, yoga, heavy gardening (digging, planting with tools) or push-ups  ----------------------------------------------------------------------------------------------------------------------  Controlling Your Cholesterol  Reduce the amount of saturated and trans fat in your diet.  Limit intake of red meat.  Consume only low-fat or non-fat/skim dairy.  Limit fried food.  Cook with vegetable oils.  Reduce your intake of sugary foods, sugary drinks and alcohol.  Eat a diet high in fruit, vegetables and whole grains.  Get protein from fish, poultry and a small portion of nuts.  Stay active.  Try to get at least 90 to 150 minutes of exercise per week.  Try brisk walking, swimming, bicycling or dancing.  Maintain a healthy weight by balancing your diet and exercise.  If you have been prescribed medication, take it regularly and exactly as prescribed. Let your PCP know if you have any problems or questions about your medication.  Its important to know your cholesterol numbers.  When recommended by your PCP, get the cholesterol blood test.  ----------------------------------------------------------------------------------------------------------------------  Reducing Your Risk of Falls  Tell your PCP if any of your medications make you feel tired, dizzy, lightheaded or off-balance.  Maintain coordination, flexibility and balance by ensuring regular physical  activity.  Limit alcohol intake to 1 drink per day.  Consider avoiding all alcohol intake.  Ensure good vision.  Visit an ophthalmologist or optometrist regularly for vision screening or to make sure your glasses / contact lens prescription is correct.  If you need glasses or contacts, wear them.  When you get new glasses or contacts, take time to get used to them.  Do not wear sunglasses or tinted lenses when indoors.  Ensure good hearing.  Have your hearing checked if you are having trouble hearing, or family and friends think you cannot hear them.  If you need a hearing aid, be sure it fits well and wear it.  Get enough rest.  Ensure about 7-9 hours of sleep every day.  Get up slowly from your bed or chairs.  Do not start walking until you are sure you feel steady.  Wear non-skid, rubber-soled, low-heeled shoes.  Do not walk in socks, or in shoes and slippers with smooth soles.  If your PCP or therapist recommends using a cane or walker, use it regularly.  Make your home safer.  Increase lighting throughout the house, especially at the top and bottom of stairs.  Ensure lighting is easily turned on when getting up in the middle of the night.  Make sure there are two secure rails on all stairs.  Install grab bars in the bathtub / shower and near the toilet.  Consider using a shower chair and / or a hand-held shower.  Spread sand or salt on icy surfaces.  Beware of wet surfaces, which can be icy.  Tell your PCP if you have fallen.

## 2025-04-18 NOTE — Clinical Note
Can you call him and tell him to take 5000 IU of D3 a day for his low calcium.  Nephro recommended it.  He doesn't check portal.

## 2025-04-21 ENCOUNTER — TELEPHONE (OUTPATIENT)
Dept: FAMILY MEDICINE | Facility: CLINIC | Age: 89
End: 2025-04-21
Payer: MEDICARE

## 2025-04-21 NOTE — TELEPHONE ENCOUNTER
Pt called. He said he spoke with a nurse but did not recall her name.    He was advised to take D3 supplement but was unsure what strength or how much to take per day.     Pt said he was supposed to see [facility] and thought it may have been them. Gave him their number to call.

## 2025-04-21 NOTE — TELEPHONE ENCOUNTER
Pt calling again looking for how many mg of Vit D3 he is supposed to be taking.     Per last ov summary it says to take 5000 IPU of D3 Daily.     Patient at pharm now     Per [employer] converts to 125 mcg. Also told him to double check with pharmacist

## 2025-05-20 VITALS — DIASTOLIC BLOOD PRESSURE: 77 MMHG | SYSTOLIC BLOOD PRESSURE: 117 MMHG

## 2025-07-08 ENCOUNTER — TELEPHONE (OUTPATIENT)
Dept: FAMILY MEDICINE | Facility: CLINIC | Age: 89
End: 2025-07-08
Payer: MEDICARE